# Patient Record
Sex: FEMALE | Race: WHITE | NOT HISPANIC OR LATINO | URBAN - METROPOLITAN AREA
[De-identification: names, ages, dates, MRNs, and addresses within clinical notes are randomized per-mention and may not be internally consistent; named-entity substitution may affect disease eponyms.]

---

## 2018-07-30 ENCOUNTER — EMERGENCY (EMERGENCY)
Facility: HOSPITAL | Age: 23
LOS: 0 days | Discharge: HOME | End: 2018-07-31
Attending: EMERGENCY MEDICINE | Admitting: EMERGENCY MEDICINE

## 2018-07-30 VITALS
WEIGHT: 125 LBS | DIASTOLIC BLOOD PRESSURE: 87 MMHG | HEIGHT: 64 IN | HEART RATE: 84 BPM | OXYGEN SATURATION: 98 % | TEMPERATURE: 98 F | RESPIRATION RATE: 17 BRPM | SYSTOLIC BLOOD PRESSURE: 118 MMHG

## 2018-07-30 DIAGNOSIS — Z98.890 OTHER SPECIFIED POSTPROCEDURAL STATES: Chronic | ICD-10-CM

## 2018-07-30 DIAGNOSIS — N12 TUBULO-INTERSTITIAL NEPHRITIS, NOT SPECIFIED AS ACUTE OR CHRONIC: ICD-10-CM

## 2018-07-30 DIAGNOSIS — Z79.899 OTHER LONG TERM (CURRENT) DRUG THERAPY: ICD-10-CM

## 2018-07-30 DIAGNOSIS — J45.909 UNSPECIFIED ASTHMA, UNCOMPLICATED: ICD-10-CM

## 2018-07-30 DIAGNOSIS — R10.9 UNSPECIFIED ABDOMINAL PAIN: ICD-10-CM

## 2018-07-30 LAB
ALBUMIN SERPL ELPH-MCNC: 4 G/DL — SIGNIFICANT CHANGE UP (ref 3.5–5.2)
ALP SERPL-CCNC: 66 U/L — SIGNIFICANT CHANGE UP (ref 30–115)
ALT FLD-CCNC: 13 U/L — SIGNIFICANT CHANGE UP (ref 0–41)
ANION GAP SERPL CALC-SCNC: 13 MMOL/L — SIGNIFICANT CHANGE UP (ref 7–14)
APPEARANCE UR: CLEAR — SIGNIFICANT CHANGE UP
AST SERPL-CCNC: 18 U/L — SIGNIFICANT CHANGE UP (ref 0–41)
BACTERIA # UR AUTO: ABNORMAL
BASOPHILS # BLD AUTO: 0.02 K/UL — SIGNIFICANT CHANGE UP (ref 0–0.2)
BASOPHILS NFR BLD AUTO: 0.4 % — SIGNIFICANT CHANGE UP (ref 0–1)
BILIRUB SERPL-MCNC: <0.2 MG/DL — SIGNIFICANT CHANGE UP (ref 0.2–1.2)
BILIRUB UR-MCNC: NEGATIVE — SIGNIFICANT CHANGE UP
BUN SERPL-MCNC: 11 MG/DL — SIGNIFICANT CHANGE UP (ref 10–20)
CALCIUM SERPL-MCNC: 9.5 MG/DL — SIGNIFICANT CHANGE UP (ref 8.5–10.1)
CHLORIDE SERPL-SCNC: 104 MMOL/L — SIGNIFICANT CHANGE UP (ref 98–110)
CO2 SERPL-SCNC: 25 MMOL/L — SIGNIFICANT CHANGE UP (ref 17–32)
COD CRY URNS QL: NEGATIVE — SIGNIFICANT CHANGE UP
COLOR SPEC: YELLOW — SIGNIFICANT CHANGE UP
CREAT SERPL-MCNC: 0.5 MG/DL — LOW (ref 0.7–1.5)
DIFF PNL FLD: ABNORMAL
EOSINOPHIL # BLD AUTO: 0.03 K/UL — SIGNIFICANT CHANGE UP (ref 0–0.7)
EOSINOPHIL NFR BLD AUTO: 0.5 % — SIGNIFICANT CHANGE UP (ref 0–8)
EPI CELLS # UR: ABNORMAL /HPF
GLUCOSE SERPL-MCNC: 104 MG/DL — HIGH (ref 70–99)
GLUCOSE UR QL: NEGATIVE — SIGNIFICANT CHANGE UP
GRAN CASTS # UR COMP ASSIST: NEGATIVE — SIGNIFICANT CHANGE UP
HCT VFR BLD CALC: 38.1 % — SIGNIFICANT CHANGE UP (ref 37–47)
HGB BLD-MCNC: 13 G/DL — SIGNIFICANT CHANGE UP (ref 12–16)
HYALINE CASTS # UR AUTO: NEGATIVE — SIGNIFICANT CHANGE UP
IMM GRANULOCYTES NFR BLD AUTO: 0.4 % — HIGH (ref 0.1–0.3)
KETONES UR-MCNC: NEGATIVE — SIGNIFICANT CHANGE UP
LEUKOCYTE ESTERASE UR-ACNC: NEGATIVE — SIGNIFICANT CHANGE UP
LYMPHOCYTES # BLD AUTO: 1.36 K/UL — SIGNIFICANT CHANGE UP (ref 1.2–3.4)
LYMPHOCYTES # BLD AUTO: 24.8 % — SIGNIFICANT CHANGE UP (ref 20.5–51.1)
MCHC RBC-ENTMCNC: 28.6 PG — SIGNIFICANT CHANGE UP (ref 27–31)
MCHC RBC-ENTMCNC: 34.1 G/DL — SIGNIFICANT CHANGE UP (ref 32–37)
MCV RBC AUTO: 83.7 FL — SIGNIFICANT CHANGE UP (ref 81–99)
MONOCYTES # BLD AUTO: 0.6 K/UL — SIGNIFICANT CHANGE UP (ref 0.1–0.6)
MONOCYTES NFR BLD AUTO: 10.9 % — HIGH (ref 1.7–9.3)
NEUTROPHILS # BLD AUTO: 3.46 K/UL — SIGNIFICANT CHANGE UP (ref 1.4–6.5)
NEUTROPHILS NFR BLD AUTO: 63 % — SIGNIFICANT CHANGE UP (ref 42.2–75.2)
NITRITE UR-MCNC: NEGATIVE — SIGNIFICANT CHANGE UP
NRBC # BLD: 0 /100 WBCS — SIGNIFICANT CHANGE UP (ref 0–0)
PH UR: 6 — SIGNIFICANT CHANGE UP (ref 5–8)
PLATELET # BLD AUTO: 204 K/UL — SIGNIFICANT CHANGE UP (ref 130–400)
POTASSIUM SERPL-MCNC: 3.6 MMOL/L — SIGNIFICANT CHANGE UP (ref 3.5–5)
POTASSIUM SERPL-SCNC: 3.6 MMOL/L — SIGNIFICANT CHANGE UP (ref 3.5–5)
PROT SERPL-MCNC: 6.8 G/DL — SIGNIFICANT CHANGE UP (ref 6–8)
PROT UR-MCNC: >=300 MG/DL — SIGNIFICANT CHANGE UP
RBC # BLD: 4.55 M/UL — SIGNIFICANT CHANGE UP (ref 4.2–5.4)
RBC # FLD: 12.4 % — SIGNIFICANT CHANGE UP (ref 11.5–14.5)
RBC CASTS # UR COMP ASSIST: ABNORMAL /HPF
SODIUM SERPL-SCNC: 142 MMOL/L — SIGNIFICANT CHANGE UP (ref 135–146)
SP GR SPEC: >=1.03 (ref 1.01–1.03)
TRI-PHOS CRY UR QL COMP ASSIST: NEGATIVE — SIGNIFICANT CHANGE UP
URATE CRY FLD QL MICRO: NEGATIVE — SIGNIFICANT CHANGE UP
UROBILINOGEN FLD QL: 0.2 — SIGNIFICANT CHANGE UP (ref 0.2–0.2)
WBC # BLD: 5.49 K/UL — SIGNIFICANT CHANGE UP (ref 4.8–10.8)
WBC # FLD AUTO: 5.49 K/UL — SIGNIFICANT CHANGE UP (ref 4.8–10.8)
WBC UR QL: NEGATIVE — SIGNIFICANT CHANGE UP

## 2018-07-30 RX ORDER — CEFTRIAXONE 500 MG/1
1 INJECTION, POWDER, FOR SOLUTION INTRAMUSCULAR; INTRAVENOUS ONCE
Qty: 0 | Refills: 0 | Status: COMPLETED | OUTPATIENT
Start: 2018-07-30 | End: 2018-07-30

## 2018-07-30 RX ORDER — SODIUM CHLORIDE 9 MG/ML
1000 INJECTION INTRAMUSCULAR; INTRAVENOUS; SUBCUTANEOUS ONCE
Qty: 0 | Refills: 0 | Status: COMPLETED | OUTPATIENT
Start: 2018-07-30 | End: 2018-07-30

## 2018-07-30 RX ORDER — CEFPODOXIME PROXETIL 100 MG
1 TABLET ORAL
Qty: 20 | Refills: 0
Start: 2018-07-30 | End: 2018-08-08

## 2018-07-30 RX ADMIN — SODIUM CHLORIDE 1000 MILLILITER(S): 9 INJECTION INTRAMUSCULAR; INTRAVENOUS; SUBCUTANEOUS at 20:46

## 2018-07-30 NOTE — ED PROVIDER NOTE - NS ED ROS FT
Review of Systems:  	•	CONSTITUTIONAL - no fever, no diaphoresis, no chills  	•	SKIN - no rash  	•	  	•	RESPIRATORY - no shortness of breath, no cough  	•	CARDIAC - no chest pain, no palpitations  	•	GI -abd pain, no nausea, no vomiting, no diarrhea, no constipation  	•	GENITO-URINARY - no discharge, no dysuria; no hematuria, no increased urinary frequency  	•	MUSCULOSKELETAL - no joint paint, no swelling, no redness  	•	NEUROLOGIC - no weakness, no headache, no paresthesias, no LOC  	•	PSYCH - no anxiety, non suicidal, non homicidal, no hallucination, no depression

## 2018-07-30 NOTE — ED PROVIDER NOTE - OBJECTIVE STATEMENT
this is 22 yo female presents to ed evaluation of flank pain . patient states she was diagnosed with kidney stones about a week ago. patient states that pain got worse tonight . patient has history of stones in past. patient denies any nausea and or vomiting. patient was scheduled for lipotrispy. patient states pain worse today .

## 2018-07-30 NOTE — ED PROVIDER NOTE - PROGRESS NOTE DETAILS
spoke to surgical pa  he contacted urologist  . they reviewed results . they will follow patient in office silas as scheduled.  will given antibiotics ATTENDING NOTE:  I personally evaluated the patient. I reviewed the Resident’s or Physician Assistant’s note (as assigned above), and agree with the findings and plan except as documented in my note.  23yF with hx of known kidney stone to left presents to ED for left sided flank pain and fever.  Pt states she developed pain to left flank 1 week ago, seen by her urologist and had (+)stone seen on [facility] xray.  Pt started on Flomax and surgical intervention scheduled for tomorrow.  Pt over last 2 days has had gradually worsening of pain and today developed fever.  Tmax 104.9  Pt contacted her urologist and was advised to come to ED for eval.  No vomiting.  No diarrhea.  No rash.  No dark urine, dysuria, frequency.  Exam as above.  No CVA tenderness.  PLAN:  Labs, urine, analgesia, surgery consult, CT.

## 2018-07-30 NOTE — ED PROVIDER NOTE - MEDICAL DECISION MAKING DETAILS
pt left flank pain 0  scheudled for lithotripsy with Dr gunn tomorrw- p/w fever  today  and continued flank pain -  CT no obstructing  kidney ston + pyelo -  pt tolerting PO no vomiting  in ED -  labs reviewe d-  ll results  discussed and printed for patient -  will dc with ab for pyelo and follow up urology tomorro w- case was disucssed withurology   and  urology  saw patient in ED - they recommend  DC pt left flank pain 0  scheduled for lithotripsy with Dr gunn tomorrow- p/w fever  today  and continued flank pain -  CT no obstructing  kidney stone + pyelo -  pt tolerating PO no vomiting  in ED -  labs reviewe d-  ll results  discussed and printed for patient -  will dc with ab for pyelo and follow up urology tomorro w- case was disucssed withurology   and  urology  saw patient in ED - they recommend  DC

## 2018-07-30 NOTE — ED ADULT TRIAGE NOTE - CHIEF COMPLAINT QUOTE
left abdominal pain radiating to back,  started 2 days ago, was due to get stones blasted but developed fever.

## 2018-07-31 VITALS
SYSTOLIC BLOOD PRESSURE: 123 MMHG | TEMPERATURE: 98 F | DIASTOLIC BLOOD PRESSURE: 81 MMHG | OXYGEN SATURATION: 99 % | RESPIRATION RATE: 18 BRPM | HEART RATE: 69 BPM

## 2018-07-31 RX ADMIN — CEFTRIAXONE 100 GRAM(S): 500 INJECTION, POWDER, FOR SOLUTION INTRAMUSCULAR; INTRAVENOUS at 00:21

## 2018-12-11 ENCOUNTER — EMERGENCY (EMERGENCY)
Facility: HOSPITAL | Age: 23
LOS: 0 days | Discharge: HOME | End: 2018-12-11
Attending: EMERGENCY MEDICINE | Admitting: EMERGENCY MEDICINE

## 2018-12-11 VITALS
DIASTOLIC BLOOD PRESSURE: 60 MMHG | WEIGHT: 126.99 LBS | OXYGEN SATURATION: 99 % | SYSTOLIC BLOOD PRESSURE: 142 MMHG | RESPIRATION RATE: 17 BRPM | TEMPERATURE: 100 F | HEART RATE: 94 BPM

## 2018-12-11 DIAGNOSIS — M54.9 DORSALGIA, UNSPECIFIED: ICD-10-CM

## 2018-12-11 DIAGNOSIS — J45.909 UNSPECIFIED ASTHMA, UNCOMPLICATED: ICD-10-CM

## 2018-12-11 DIAGNOSIS — Z79.899 OTHER LONG TERM (CURRENT) DRUG THERAPY: ICD-10-CM

## 2018-12-11 DIAGNOSIS — Z98.890 OTHER SPECIFIED POSTPROCEDURAL STATES: Chronic | ICD-10-CM

## 2018-12-11 DIAGNOSIS — M25.552 PAIN IN LEFT HIP: ICD-10-CM

## 2018-12-11 DIAGNOSIS — Z79.2 LONG TERM (CURRENT) USE OF ANTIBIOTICS: ICD-10-CM

## 2018-12-11 PROBLEM — N20.0 CALCULUS OF KIDNEY: Chronic | Status: ACTIVE | Noted: 2018-07-30

## 2018-12-11 RX ORDER — METHOCARBAMOL 500 MG/1
1500 TABLET, FILM COATED ORAL ONCE
Qty: 0 | Refills: 0 | Status: COMPLETED | OUTPATIENT
Start: 2018-12-11 | End: 2018-12-11

## 2018-12-11 RX ORDER — METHOCARBAMOL 500 MG/1
2 TABLET, FILM COATED ORAL
Qty: 12 | Refills: 0
Start: 2018-12-11 | End: 2018-12-13

## 2018-12-11 RX ORDER — IBUPROFEN 200 MG
1 TABLET ORAL
Qty: 60 | Refills: 0
Start: 2018-12-11 | End: 2018-12-30

## 2018-12-11 RX ORDER — KETOROLAC TROMETHAMINE 30 MG/ML
30 SYRINGE (ML) INJECTION ONCE
Qty: 0 | Refills: 0 | Status: DISCONTINUED | OUTPATIENT
Start: 2018-12-11 | End: 2018-12-11

## 2018-12-11 RX ORDER — ACETAMINOPHEN 500 MG
650 TABLET ORAL ONCE
Qty: 0 | Refills: 0 | Status: COMPLETED | OUTPATIENT
Start: 2018-12-11 | End: 2018-12-11

## 2018-12-11 RX ADMIN — METHOCARBAMOL 1500 MILLIGRAM(S): 500 TABLET, FILM COATED ORAL at 02:00

## 2018-12-11 RX ADMIN — Medication 650 MILLIGRAM(S): at 02:00

## 2018-12-11 RX ADMIN — Medication 30 MILLIGRAM(S): at 02:00

## 2018-12-11 NOTE — ED PROVIDER NOTE - NS ED ROS FT
Constitutional: no fever, chills, no recent weight loss, change in appetite or malaise  Eyes: no redness/discharge/pain/vision changes  Cardiac: No chest pain, SOB or edema.  Respiratory: No cough or respiratory distress  GI: No nausea, vomiting, diarrhea or abdominal pain.  MS: + left sided hip pain with radiation down leg.   Neuro: No headache or weakness. No LOC. No decreased sensation/paresthesias  Skin: No skin rash.  Except as documented in the HPI, all other systems are negative.

## 2018-12-11 NOTE — ED PROVIDER NOTE - NSFOLLOWUPCLINICS_GEN_ALL_ED_FT
[facility]  Rehabilitation  [address] [place] [address]  Phone: [phone]  Fax:   Follow Up Time:

## 2018-12-11 NOTE — ED PROVIDER NOTE - ATTENDING CONTRIBUTION TO CARE
I personally evaluated the patient. I reviewed the Resident’s or Physician Assistant’s note (as assigned above), and agree with the findings and plan except as documented in my note.     23 female here for evaluation of left hip pain for several weeks worse recently. Under care of a chiropractor who manipulates and adjusts her 3x/week with no relief. No fever chills back pain or vomiting.     PE: female in no distress. HEENT: normal mucosa. CHEST: CTA bilateral. CV: pulses intact. EXTR: limited ROM of the LLE due to discomfort. NEURO: AAO 3 no focal deficits.     Impression: back pain     Plan: supportive care, reevaluation

## 2018-12-11 NOTE — ED PROVIDER NOTE - PHYSICAL EXAMINATION
CONSTITUTIONAL: Well-appearing; well-nourished; in no apparent distress.   NECK: Supple; non-tender; no cervical lymphadenopathy. No JVD.   CARDIOVASCULAR: Normal S1, S2; no murmurs, rubs, or gallops.   RESPIRATORY: Normal chest excursion with respiration; breath sounds clear and equal bilaterally; no wheezes, rhonchi, or rales.  GI/: Normal bowel sounds; non-distended; non-tender; no palpable organomegaly.   MS: No evidence of trauma or deformity. No midline spinal tenderness. Stable pelvis. Strength equal b/l 5/5 throughout. Normal ROM in all four extremities; + straight leg test. Normal gait. Pedal pulses intact  SKIN: Normal for age and race; warm; dry; good turgor; no apparent lesions or exudate.   NEURO/PSYCH: A & O x 4; grossly unremarkable. mood and manner are appropriate. Grooming and personal hygiene are appropriate.

## 2018-12-11 NOTE — ED PROVIDER NOTE - OBJECTIVE STATEMENT
23 year old female with pmhx of juvenile RA c/o left hip pain with radiation down left hip x 2 weeks. Pt had MRI of L/S spine done in [email or web address] on 12/01 which showed mild L4-L5 disc protrusion. She denies any trauma, decreased sensation, urinary/fecal incontinence, saddle anesthesia, fever/chills, IVDA, bruising, headache, neck pain, chest pain, sob.

## 2018-12-11 NOTE — ED PROVIDER NOTE - MEDICAL DECISION MAKING DETAILS
23 female here for left leg pain. Has outpatient management not improving care. Has Follow up with Ortho this week.   No acute neurologic deficits. Supportive care, outpatient management.

## 2018-12-17 ENCOUNTER — EMERGENCY (EMERGENCY)
Facility: HOSPITAL | Age: 23
LOS: 0 days | Discharge: HOME | End: 2018-12-17
Attending: EMERGENCY MEDICINE | Admitting: EMERGENCY MEDICINE

## 2018-12-17 VITALS
RESPIRATION RATE: 17 BRPM | DIASTOLIC BLOOD PRESSURE: 57 MMHG | TEMPERATURE: 98 F | SYSTOLIC BLOOD PRESSURE: 116 MMHG | HEART RATE: 84 BPM | OXYGEN SATURATION: 100 % | WEIGHT: 125 LBS

## 2018-12-17 DIAGNOSIS — M25.562 PAIN IN LEFT KNEE: ICD-10-CM

## 2018-12-17 DIAGNOSIS — M25.552 PAIN IN LEFT HIP: ICD-10-CM

## 2018-12-17 DIAGNOSIS — J45.909 UNSPECIFIED ASTHMA, UNCOMPLICATED: ICD-10-CM

## 2018-12-17 DIAGNOSIS — M25.569 PAIN IN UNSPECIFIED KNEE: ICD-10-CM

## 2018-12-17 DIAGNOSIS — Z98.890 OTHER SPECIFIED POSTPROCEDURAL STATES: Chronic | ICD-10-CM

## 2018-12-17 DIAGNOSIS — Z79.899 OTHER LONG TERM (CURRENT) DRUG THERAPY: ICD-10-CM

## 2018-12-17 RX ORDER — ACETAMINOPHEN 500 MG
975 TABLET ORAL ONCE
Qty: 0 | Refills: 0 | Status: COMPLETED | OUTPATIENT
Start: 2018-12-17 | End: 2018-12-17

## 2018-12-17 RX ADMIN — Medication 975 MILLIGRAM(S): at 06:14

## 2018-12-17 NOTE — ED PROVIDER NOTE - NS ED ROS FT
aching Constitutional: no fevers/chills, no sick contacts  Eyes: No visual changes, eye pain or discharge. No photophobia  ENMT: No hearing changes, pain, discharge or infections. No sore throat or drooling.  Neck:  No neck pain or stiffness. No limited ROM  Cardiac: No SOB or edema. No chest pain with exertion.  Respiratory: No cough or respiratory distress. No hemoptysis. No history of asthma or RAD  GI: No nausea, vomiting, diarrhea or abdominal pain  : No dysuria, frequency or burning. No discharge  MS: No myalgia, muscle weakness, + joint pain, no back pain  Neuro: No headache or weakness. No LOC  Skin: No skin rash  Endo: no diabetes or thyroid dysfunction  Heme: no abnormal bleeding or clotting  Except as documented in the HPI, all other systems are negative

## 2018-12-17 NOTE — ED ADULT NURSE NOTE - OBJECTIVE STATEMENT
pt is a 23yF p/w L hip and knee pain x 2wk.  Pt is ambulating on her LLE but complains of worsening pain, especially tonight.  She reports pain is worse than her usual achy arthritis pains and not relieved by robaxin prescribed by neurology and ortho.  She saw these specialists when pain started last week,  Has not taken any pain medications tonight. full rom, no swelling/redness/warmth, no fever.

## 2018-12-17 NOTE — ED PROVIDER NOTE - MEDICAL DECISION MAKING DETAILS
23yF p/w worsening LLE pain in the midst of extensive o/p specialist workup.  Well appearing, well hydrated young woman.  No joint edema or gross deformity. Able to ambulate.  No PE risk factors.  Will treat pain and recommend close f/u with PCP and specialists, supportive care, return precautions.

## 2018-12-17 NOTE — ED PROVIDER NOTE - OBJECTIVE STATEMENT
23yF with RA p/w L hip and knee pain x 2wk.  Pt is ambulating on her LLE but complains of worsening pain, especially tonight.  She reports pain is worse than her usual achy arthritis pains and not relieved by robaxin prescribed by neurology and ortho.  She saw these specialists when pain started last week, has had neg MRI and has a follow up appointment today for nerve study.  No PE risk factors.  Has not taken any pain medications tonight.

## 2018-12-17 NOTE — ED PROVIDER NOTE - NSFOLLOWUPINSTRUCTIONS_ED_ALL_ED_FT
Follow up with your primary physician and specialists for your joint pain.      Knee Pain, Adult  Knee pain in adults is common. It can be caused by many things, including:    Arthritis.  A fluid-filled sac (cyst) or growth in your knee.  An infection in your knee.  An injury that will not heal.  Damage, swelling, or irritation of the tissues that support your knee.    ImageKnee pain is usually not a sign of a serious problem. The pain may go away on its own with time and rest. If it does not, a health care provider may order tests to find the cause of the pain. These may include:    Imaging tests, such as an X-ray, MRI, or ultrasound.  Joint aspiration. In this test, fluid is removed from the knee.  Arthroscopy. In this test, a lighted tube is inserted into knee and an image is projected onto a TV screen.  A biopsy. In this test, a sample of tissue is removed from the body and studied under a microscope.    Follow these instructions at home:  Pay attention to any changes in your symptoms. Take these actions to relieve your pain.    Activity     Rest your knee.  Do not do things that cause pain or make pain worse.  Avoid high-impact activities or exercises, such as running, jumping rope, or doing jumping jacks.  General instructions     Take over-the-counter and prescription medicines only as told by your health care provider.  Raise (elevate) your knee above the level of your heart when you are sitting or lying down.  Sleep with a pillow under your knee.  If directed, apply ice to the knee:    Put ice in a plastic bag.  Place a towel between your skin and the bag.  Leave the ice on for 20 minutes, 2–3 times a day.    Ask your health care provider if you should wear an elastic knee support.  Lose weight if you are overweight. Extra weight can put pressure on your knee.  Do not use any products that contain nicotine or tobacco, such as cigarettes and e-cigarettes. Smoking may slow the healing of any bone and joint problems that you may have. If you need help quitting, ask your health care provider.  Contact a health care provider if:  Your knee pain continues, changes, or gets worse.  You have a fever along with knee pain.  Your knee calderon or locks up.  Your knee swells, and the swelling becomes worse.  Get help right away if:  Your knee feels warm to the touch.  You cannot move your knee.  You have severe pain in your knee.  You have chest pain.  You have trouble breathing.  Summary  Knee pain in adults is common. It can be caused by many things, including, arthritis, infection, cysts, or injury.  Knee pain is usually not a sign of a serious problem, but if it does not go away, a health care provider may perform tests to know the cause of the pain.  Pay attention to any changes in your symptoms. Relieve your pain with rest, medicines, light activity, and use of ice.  Get help if your pain continues or becomes very severe, or if your knee calderon or locks up, or if you have chest pain or trouble breathing.  This information is not intended to replace advice given to you by your health care provider. Make sure you discuss any questions you have with your health care provider.

## 2018-12-17 NOTE — ED PROVIDER NOTE - PHYSICAL EXAMINATION
CONSTITUTIONAL: well developed; well nourished; well appearing in no acute distress  HEAD: normocephalic; atraumatic  EYES: no conjunctival injection, no scleral icterus  ENT: no nasal discharge; airway clear.  NECK: supple; non tender. + full passive ROM in all directions  CARD: S1, S2 normal; no murmurs, gallops, or rubs. Regular rate and rhythm  RESP: no wheezes, rales or rhonchi. Good air movement bilaterally without significant accessory muscle use  ABD: soft; non-distended; non-tender. No rebound, no guarding, no pulsatile abdominal mass  EXT: moving all extremities spontaneously, normal ROM. No clubbing, cyanosis or edema, mild crepitus on knee flexion, no laxity on varus/valgus stress  SKIN: warm and dry, no lesions noted  NEURO: alert, oriented, CN II-XII grossly intact, motor and sensory grossly intact, speech nonslurred, stable gait, no focal deficits. GCS 15  PSYCH: calm, cooperative, appropriate, good eye contact, logical thought process, no apparent danger to self or others

## 2019-01-07 ENCOUNTER — EMERGENCY (EMERGENCY)
Facility: HOSPITAL | Age: 24
LOS: 0 days | Discharge: HOME | End: 2019-01-07
Attending: EMERGENCY MEDICINE | Admitting: EMERGENCY MEDICINE

## 2019-01-07 VITALS
SYSTOLIC BLOOD PRESSURE: 121 MMHG | RESPIRATION RATE: 18 BRPM | TEMPERATURE: 98 F | HEART RATE: 96 BPM | OXYGEN SATURATION: 100 % | DIASTOLIC BLOOD PRESSURE: 86 MMHG | WEIGHT: 125 LBS

## 2019-01-07 VITALS
OXYGEN SATURATION: 100 % | HEART RATE: 84 BPM | DIASTOLIC BLOOD PRESSURE: 84 MMHG | RESPIRATION RATE: 20 BRPM | SYSTOLIC BLOOD PRESSURE: 128 MMHG | TEMPERATURE: 98 F

## 2019-01-07 DIAGNOSIS — Z79.2 LONG TERM (CURRENT) USE OF ANTIBIOTICS: ICD-10-CM

## 2019-01-07 DIAGNOSIS — Z79.899 OTHER LONG TERM (CURRENT) DRUG THERAPY: ICD-10-CM

## 2019-01-07 DIAGNOSIS — R50.9 FEVER, UNSPECIFIED: ICD-10-CM

## 2019-01-07 DIAGNOSIS — J45.909 UNSPECIFIED ASTHMA, UNCOMPLICATED: ICD-10-CM

## 2019-01-07 DIAGNOSIS — R11.2 NAUSEA WITH VOMITING, UNSPECIFIED: ICD-10-CM

## 2019-01-07 DIAGNOSIS — Z79.1 LONG TERM (CURRENT) USE OF NON-STEROIDAL ANTI-INFLAMMATORIES (NSAID): ICD-10-CM

## 2019-01-07 DIAGNOSIS — Z98.890 OTHER SPECIFIED POSTPROCEDURAL STATES: Chronic | ICD-10-CM

## 2019-01-07 DIAGNOSIS — R63.8 OTHER SYMPTOMS AND SIGNS CONCERNING FOOD AND FLUID INTAKE: ICD-10-CM

## 2019-01-07 LAB
ALBUMIN SERPL ELPH-MCNC: 4 G/DL — SIGNIFICANT CHANGE UP (ref 3.5–5.2)
ALP SERPL-CCNC: 78 U/L — SIGNIFICANT CHANGE UP (ref 30–115)
ALT FLD-CCNC: 30 U/L — SIGNIFICANT CHANGE UP (ref 0–41)
ANION GAP SERPL CALC-SCNC: 11 MMOL/L — SIGNIFICANT CHANGE UP (ref 7–14)
APPEARANCE UR: ABNORMAL
AST SERPL-CCNC: 38 U/L — SIGNIFICANT CHANGE UP (ref 0–41)
BACTERIA # UR AUTO: ABNORMAL
BASOPHILS # BLD AUTO: 0.01 K/UL — SIGNIFICANT CHANGE UP (ref 0–0.2)
BASOPHILS NFR BLD AUTO: 0.3 % — SIGNIFICANT CHANGE UP (ref 0–1)
BILIRUB DIRECT SERPL-MCNC: <0.2 MG/DL — SIGNIFICANT CHANGE UP (ref 0–0.2)
BILIRUB INDIRECT FLD-MCNC: >0.2 MG/DL — SIGNIFICANT CHANGE UP (ref 0.2–1.2)
BILIRUB SERPL-MCNC: 0.4 MG/DL — SIGNIFICANT CHANGE UP (ref 0.2–1.2)
BILIRUB UR-MCNC: ABNORMAL
BUN SERPL-MCNC: 8 MG/DL — LOW (ref 10–20)
CALCIUM SERPL-MCNC: 9 MG/DL — SIGNIFICANT CHANGE UP (ref 8.5–10.1)
CHLORIDE SERPL-SCNC: 102 MMOL/L — SIGNIFICANT CHANGE UP (ref 98–110)
CO2 SERPL-SCNC: 25 MMOL/L — SIGNIFICANT CHANGE UP (ref 17–32)
COD CRY URNS QL: NEGATIVE — SIGNIFICANT CHANGE UP
COLOR SPEC: YELLOW — SIGNIFICANT CHANGE UP
CREAT SERPL-MCNC: 0.7 MG/DL — SIGNIFICANT CHANGE UP (ref 0.7–1.5)
DIFF PNL FLD: ABNORMAL
EOSINOPHIL # BLD AUTO: 0.03 K/UL — SIGNIFICANT CHANGE UP (ref 0–0.7)
EOSINOPHIL NFR BLD AUTO: 0.8 % — SIGNIFICANT CHANGE UP (ref 0–8)
EPI CELLS # UR: ABNORMAL /HPF
GLUCOSE SERPL-MCNC: 89 MG/DL — SIGNIFICANT CHANGE UP (ref 70–99)
GLUCOSE UR QL: NEGATIVE MG/DL — SIGNIFICANT CHANGE UP
GRAN CASTS # UR COMP ASSIST: NEGATIVE — SIGNIFICANT CHANGE UP
HCT VFR BLD CALC: 36.6 % — LOW (ref 37–47)
HGB BLD-MCNC: 12.6 G/DL — SIGNIFICANT CHANGE UP (ref 12–16)
HYALINE CASTS # UR AUTO: NEGATIVE — SIGNIFICANT CHANGE UP
IMM GRANULOCYTES NFR BLD AUTO: 0.3 % — SIGNIFICANT CHANGE UP (ref 0.1–0.3)
KETONES UR-MCNC: 40
LEUKOCYTE ESTERASE UR-ACNC: NEGATIVE — SIGNIFICANT CHANGE UP
LYMPHOCYTES # BLD AUTO: 0.83 K/UL — LOW (ref 1.2–3.4)
LYMPHOCYTES # BLD AUTO: 22.2 % — SIGNIFICANT CHANGE UP (ref 20.5–51.1)
MCHC RBC-ENTMCNC: 27.6 PG — SIGNIFICANT CHANGE UP (ref 27–31)
MCHC RBC-ENTMCNC: 34.4 G/DL — SIGNIFICANT CHANGE UP (ref 32–37)
MCV RBC AUTO: 80.3 FL — LOW (ref 81–99)
MONOCYTES # BLD AUTO: 0.36 K/UL — SIGNIFICANT CHANGE UP (ref 0.1–0.6)
MONOCYTES NFR BLD AUTO: 9.6 % — HIGH (ref 1.7–9.3)
NEUTROPHILS # BLD AUTO: 2.5 K/UL — SIGNIFICANT CHANGE UP (ref 1.4–6.5)
NEUTROPHILS NFR BLD AUTO: 66.8 % — SIGNIFICANT CHANGE UP (ref 42.2–75.2)
NITRITE UR-MCNC: NEGATIVE — SIGNIFICANT CHANGE UP
NRBC # BLD: 0 /100 WBCS — SIGNIFICANT CHANGE UP (ref 0–0)
PH UR: 6 — SIGNIFICANT CHANGE UP (ref 5–8)
PLATELET # BLD AUTO: 140 K/UL — SIGNIFICANT CHANGE UP (ref 130–400)
POTASSIUM SERPL-MCNC: 3.8 MMOL/L — SIGNIFICANT CHANGE UP (ref 3.5–5)
POTASSIUM SERPL-SCNC: 3.8 MMOL/L — SIGNIFICANT CHANGE UP (ref 3.5–5)
PROT SERPL-MCNC: 7.2 G/DL — SIGNIFICANT CHANGE UP (ref 6–8)
PROT UR-MCNC: >=300 MG/DL
RBC # BLD: 4.56 M/UL — SIGNIFICANT CHANGE UP (ref 4.2–5.4)
RBC # FLD: 12.4 % — SIGNIFICANT CHANGE UP (ref 11.5–14.5)
RBC CASTS # UR COMP ASSIST: ABNORMAL /HPF
SODIUM SERPL-SCNC: 138 MMOL/L — SIGNIFICANT CHANGE UP (ref 135–146)
SP GR SPEC: >=1.03 (ref 1.01–1.03)
TRI-PHOS CRY UR QL COMP ASSIST: NEGATIVE — SIGNIFICANT CHANGE UP
URATE CRY FLD QL MICRO: NEGATIVE — SIGNIFICANT CHANGE UP
UROBILINOGEN FLD QL: 0.2 MG/DL — SIGNIFICANT CHANGE UP (ref 0.2–0.2)
WBC # BLD: 3.74 K/UL — LOW (ref 4.8–10.8)
WBC # FLD AUTO: 3.74 K/UL — LOW (ref 4.8–10.8)
WBC UR QL: SIGNIFICANT CHANGE UP /HPF

## 2019-01-07 RX ORDER — SODIUM CHLORIDE 9 MG/ML
1000 INJECTION, SOLUTION INTRAVENOUS
Qty: 0 | Refills: 0 | Status: DISCONTINUED | OUTPATIENT
Start: 2019-01-07 | End: 2019-01-07

## 2019-01-07 RX ORDER — ONDANSETRON 8 MG/1
4 TABLET, FILM COATED ORAL ONCE
Qty: 0 | Refills: 0 | Status: COMPLETED | OUTPATIENT
Start: 2019-01-07 | End: 2019-01-07

## 2019-01-07 RX ADMIN — ONDANSETRON 4 MILLIGRAM(S): 8 TABLET, FILM COATED ORAL at 16:24

## 2019-01-07 RX ADMIN — SODIUM CHLORIDE 1000 MILLILITER(S): 9 INJECTION, SOLUTION INTRAVENOUS at 16:25

## 2019-01-07 NOTE — ED PROVIDER NOTE - ATTENDING CONTRIBUTION TO CARE
24 yo F h/o JRA presents with c/o nausea, vomiting and diarrhea and fever x 5 days,  no urinary complaints.  no rash, no known sick contacts.  On exam pt in NAD AAO x 3, Op clear, MMM, no lad, Lungs CAT B/L no wrr, abd is soft + BS, nt nd, no edema, no fnd

## 2019-01-07 NOTE — ED PROVIDER NOTE - NS ED ROS FT
CONSTITUTIONAL: (+) fevers, (+) chills, (-) fatigue, (-) unintentional weight loss, (+) decreased appetite  EYES: (-) eye redness, (-) eye discharge, (-) tearing  ENT: (-) ear pain, (-) ear drainage, (-) congestion, (-) rhinorrhea, (-) sinus pain, (-) sore throat  NECK: (-) neck pain, (-) neck stiffness  CARDIOVASCULAR: (-) chest pain, (-) palpitations  RESPIRATORY: (-) cough, (-) sputum, (-) shortness of breath  GI: see HPI  : (-) dysuria, (-) hematuria, (-) frequency, (-) urgency, (-) incontinence, (-) difficulty urinating, (-) urinary retention, (-) flank pain, (-) vaginal discharge  MSK: (-) back pain, (-) arthralgias, (-) myalgias  INTEGUMENTARY: (-) rashes, (-) pallor, (-) ecchymosis  NEURO: (-) headache, (-) dizziness, (-) lightheadedness, (-) syncope, (-) weakness

## 2019-01-07 NOTE — ED PROVIDER NOTE - PROGRESS NOTE DETAILS
patient seen and evaluated by me. 1 L LR, 4 mg IV Zofran, ua/cx, u preg, cbc, cmp ordered. Will re-evaluate. patient resting comfortably in ED stretcher, reports resolution in her nausea. abd is soft, nontender in all four quadrants, no rebound or guarding.

## 2019-01-07 NOTE — ED PROVIDER NOTE - PHYSICAL EXAMINATION
VITALS:  I have reviewed the initial vital signs.  GENERAL: Well-developed, well-nourished, in no acute distress. Nontoxic appearing. Accompanied by mother.  HEENT: Sclera clear. EOMI, PERRLA. Mucous membranes moist, oropharynx nonerythematous without exudate. Tonsils 2+ bilaterally. Uvula midline and without edema. TM's clear b/l without bulging or erythema. Nasal turbinates clear and w/o discharge.  NECK: supple w FROM. No cervical adenopathy.  CARDIOVASCULAR: RRR, nl S1 and S2. No murmurs, rubs, or gallops. No edema. 2+ radial pulses bilaterally.  RESPIRATORY: Normal effort. CTA b/l without wheezes, rales, or rhonchi.  MSK: Moving all four extremities equally throughout.  GI: Normal bowel sounds. Abdomen soft and non-distended. Nontender in all four quadrants without rebound or guarding.  : Mild left CVA ttp. No right CVA ttp.  INTEGUMENTARY: Warm, dry. No pallor or rashes. Capillary refill <2 seconds.  NEURO: A&Ox3. Speech clear. No gross motor or sensory deficits.

## 2019-01-07 NOTE — ED PROVIDER NOTE - MEDICAL DECISION MAKING DETAILS
Pt feeling better, results d/w them.  Tolerated po, abd remains soft nt nd.  Will follow up with PMD and return if any worsening symptoms or concerns.

## 2019-01-07 NOTE — ED PROVIDER NOTE - OBJECTIVE STATEMENT
23 year old female w hx of asthma, JRA (no current or recent immunosuppressants) presents to the ED with 5 days of nausea, vomiting, diarrhea and fevers. Tmax of 103.0F earlier today, relieved with 800 mg IBU. Emesis and diarrhea are nonbloody, nonbilious. Denies recent travel, sick contacts, antibiotics, or new/suspicious foods. Denies abdominal pain, back pain, URI-like symptoms, rashes, irritative voiding symptoms, or abnormal vaginal d/c. LMP 2 weeks ago, denies chance of pregnancy. No hx of abd surgeries. Last successful po intake (fluids) 10:00 AM today.

## 2019-01-08 LAB
CULTURE RESULTS: SIGNIFICANT CHANGE UP
SPECIMEN SOURCE: SIGNIFICANT CHANGE UP

## 2019-07-29 ENCOUNTER — EMERGENCY (EMERGENCY)
Facility: HOSPITAL | Age: 24
LOS: 0 days | Discharge: HOME | End: 2019-07-29
Attending: EMERGENCY MEDICINE | Admitting: EMERGENCY MEDICINE
Payer: MEDICAID

## 2019-07-29 VITALS
DIASTOLIC BLOOD PRESSURE: 83 MMHG | HEART RATE: 99 BPM | RESPIRATION RATE: 16 BRPM | OXYGEN SATURATION: 99 % | SYSTOLIC BLOOD PRESSURE: 133 MMHG | WEIGHT: 130.07 LBS | TEMPERATURE: 98 F

## 2019-07-29 VITALS
DIASTOLIC BLOOD PRESSURE: 78 MMHG | RESPIRATION RATE: 16 BRPM | OXYGEN SATURATION: 98 % | HEART RATE: 85 BPM | TEMPERATURE: 98 F | SYSTOLIC BLOOD PRESSURE: 128 MMHG

## 2019-07-29 DIAGNOSIS — R39.198 OTHER DIFFICULTIES WITH MICTURITION: ICD-10-CM

## 2019-07-29 DIAGNOSIS — R10.9 UNSPECIFIED ABDOMINAL PAIN: ICD-10-CM

## 2019-07-29 DIAGNOSIS — Z98.890 OTHER SPECIFIED POSTPROCEDURAL STATES: Chronic | ICD-10-CM

## 2019-07-29 LAB
ALBUMIN SERPL ELPH-MCNC: 3.7 G/DL — SIGNIFICANT CHANGE UP (ref 3.5–5.2)
ALP SERPL-CCNC: 72 U/L — SIGNIFICANT CHANGE UP (ref 30–115)
ALT FLD-CCNC: 8 U/L — SIGNIFICANT CHANGE UP (ref 0–41)
ANION GAP SERPL CALC-SCNC: 9 MMOL/L — SIGNIFICANT CHANGE UP (ref 7–14)
APPEARANCE UR: CLEAR — SIGNIFICANT CHANGE UP
AST SERPL-CCNC: 14 U/L — SIGNIFICANT CHANGE UP (ref 0–41)
BACTERIA # UR AUTO: NEGATIVE — SIGNIFICANT CHANGE UP
BASOPHILS # BLD AUTO: 0.03 K/UL — SIGNIFICANT CHANGE UP (ref 0–0.2)
BASOPHILS NFR BLD AUTO: 0.4 % — SIGNIFICANT CHANGE UP (ref 0–1)
BILIRUB DIRECT SERPL-MCNC: <0.2 MG/DL — SIGNIFICANT CHANGE UP (ref 0–0.2)
BILIRUB INDIRECT FLD-MCNC: >0 MG/DL — LOW (ref 0.2–1.2)
BILIRUB SERPL-MCNC: 0.2 MG/DL — SIGNIFICANT CHANGE UP (ref 0.2–1.2)
BILIRUB UR-MCNC: NEGATIVE — SIGNIFICANT CHANGE UP
BUN SERPL-MCNC: 10 MG/DL — SIGNIFICANT CHANGE UP (ref 10–20)
CALCIUM SERPL-MCNC: 9 MG/DL — SIGNIFICANT CHANGE UP (ref 8.5–10.1)
CHLORIDE SERPL-SCNC: 103 MMOL/L — SIGNIFICANT CHANGE UP (ref 98–110)
CO2 SERPL-SCNC: 27 MMOL/L — SIGNIFICANT CHANGE UP (ref 17–32)
COLOR SPEC: SIGNIFICANT CHANGE UP
CREAT SERPL-MCNC: 0.6 MG/DL — LOW (ref 0.7–1.5)
DIFF PNL FLD: NEGATIVE — SIGNIFICANT CHANGE UP
EOSINOPHIL # BLD AUTO: 0.1 K/UL — SIGNIFICANT CHANGE UP (ref 0–0.7)
EOSINOPHIL NFR BLD AUTO: 1.2 % — SIGNIFICANT CHANGE UP (ref 0–8)
EPI CELLS # UR: 2 /HPF — SIGNIFICANT CHANGE UP (ref 0–5)
GLUCOSE SERPL-MCNC: 97 MG/DL — SIGNIFICANT CHANGE UP (ref 70–99)
GLUCOSE UR QL: NEGATIVE — SIGNIFICANT CHANGE UP
HCT VFR BLD CALC: 32.9 % — LOW (ref 37–47)
HGB BLD-MCNC: 11.2 G/DL — LOW (ref 12–16)
HYALINE CASTS # UR AUTO: 0 /LPF — SIGNIFICANT CHANGE UP (ref 0–7)
IMM GRANULOCYTES NFR BLD AUTO: 0.4 % — HIGH (ref 0.1–0.3)
KETONES UR-MCNC: NEGATIVE — SIGNIFICANT CHANGE UP
LEUKOCYTE ESTERASE UR-ACNC: NEGATIVE — SIGNIFICANT CHANGE UP
LYMPHOCYTES # BLD AUTO: 1.85 K/UL — SIGNIFICANT CHANGE UP (ref 1.2–3.4)
LYMPHOCYTES # BLD AUTO: 23 % — SIGNIFICANT CHANGE UP (ref 20.5–51.1)
MCHC RBC-ENTMCNC: 28.3 PG — SIGNIFICANT CHANGE UP (ref 27–31)
MCHC RBC-ENTMCNC: 34 G/DL — SIGNIFICANT CHANGE UP (ref 32–37)
MCV RBC AUTO: 83.1 FL — SIGNIFICANT CHANGE UP (ref 81–99)
MONOCYTES # BLD AUTO: 0.52 K/UL — SIGNIFICANT CHANGE UP (ref 0.1–0.6)
MONOCYTES NFR BLD AUTO: 6.5 % — SIGNIFICANT CHANGE UP (ref 1.7–9.3)
NEUTROPHILS # BLD AUTO: 5.53 K/UL — SIGNIFICANT CHANGE UP (ref 1.4–6.5)
NEUTROPHILS NFR BLD AUTO: 68.5 % — SIGNIFICANT CHANGE UP (ref 42.2–75.2)
NITRITE UR-MCNC: NEGATIVE — SIGNIFICANT CHANGE UP
NRBC # BLD: 0 /100 WBCS — SIGNIFICANT CHANGE UP (ref 0–0)
PH UR: 7 — SIGNIFICANT CHANGE UP (ref 5–8)
PLATELET # BLD AUTO: 190 K/UL — SIGNIFICANT CHANGE UP (ref 130–400)
POTASSIUM SERPL-MCNC: 3.9 MMOL/L — SIGNIFICANT CHANGE UP (ref 3.5–5)
POTASSIUM SERPL-SCNC: 3.9 MMOL/L — SIGNIFICANT CHANGE UP (ref 3.5–5)
PROT SERPL-MCNC: 6.6 G/DL — SIGNIFICANT CHANGE UP (ref 6–8)
PROT UR-MCNC: ABNORMAL
RBC # BLD: 3.96 M/UL — LOW (ref 4.2–5.4)
RBC # FLD: 12.3 % — SIGNIFICANT CHANGE UP (ref 11.5–14.5)
RBC CASTS # UR COMP ASSIST: 4 /HPF — SIGNIFICANT CHANGE UP (ref 0–4)
SODIUM SERPL-SCNC: 139 MMOL/L — SIGNIFICANT CHANGE UP (ref 135–146)
SP GR SPEC: 1.01 — LOW (ref 1.01–1.02)
UROBILINOGEN FLD QL: SIGNIFICANT CHANGE UP
WBC # BLD: 8.06 K/UL — SIGNIFICANT CHANGE UP (ref 4.8–10.8)
WBC # FLD AUTO: 8.06 K/UL — SIGNIFICANT CHANGE UP (ref 4.8–10.8)
WBC UR QL: 1 /HPF — SIGNIFICANT CHANGE UP (ref 0–5)

## 2019-07-29 PROCEDURE — 99284 EMERGENCY DEPT VISIT MOD MDM: CPT

## 2019-07-29 PROCEDURE — 74176 CT ABD & PELVIS W/O CONTRAST: CPT | Mod: 26

## 2019-07-29 RX ORDER — KETOROLAC TROMETHAMINE 30 MG/ML
15 SYRINGE (ML) INJECTION ONCE
Refills: 0 | Status: DISCONTINUED | OUTPATIENT
Start: 2019-07-29 | End: 2019-07-29

## 2019-07-29 RX ORDER — SODIUM CHLORIDE 9 MG/ML
2000 INJECTION, SOLUTION INTRAVENOUS ONCE
Refills: 0 | Status: COMPLETED | OUTPATIENT
Start: 2019-07-29 | End: 2019-07-29

## 2019-07-29 RX ADMIN — SODIUM CHLORIDE 2000 MILLILITER(S): 9 INJECTION, SOLUTION INTRAVENOUS at 04:30

## 2019-07-29 RX ADMIN — SODIUM CHLORIDE 2000 MILLILITER(S): 9 INJECTION, SOLUTION INTRAVENOUS at 01:48

## 2019-07-29 RX ADMIN — Medication 15 MILLIGRAM(S): at 02:04

## 2019-07-29 RX ADMIN — Medication 15 MILLIGRAM(S): at 01:49

## 2019-07-29 NOTE — ED ADULT TRIAGE NOTE - CHIEF COMPLAINT QUOTE
pt c/o R flank pain and dysuria since 6pm. pt denies hematuria and stated the pain is similar to previous stones. pt has hx of renal calculi.

## 2019-07-29 NOTE — ED PROVIDER NOTE - OBJECTIVE STATEMENT
25 yo f pw R constant non radiating mild dull flank pain similar to previous episodes of kidney stones with associated hesitancy quick onset and ongoing for sev hr in duration, no provoking or modifying factors. No fevers, no chills, no cough, no sob.    I have reviewed available current nursing and previous documentation of past medical, surgical, family, and/or social history.

## 2019-07-29 NOTE — ED PROVIDER NOTE - PHYSICAL EXAMINATION
Physical Exam    Vital Signs: I have reviewed the initial vital signs.  Constitutional: well-nourished, appears stated age, no acute distress  Cardiovascular: +S1/S2, no murmurs, regular rate, regular rhythm, well-perfused extremities  Respiratory: unlabored respiratory effort, clear to auscultation bilaterally, speaks in full sentences  Gastrointestinal: soft, non-tender abdomen, no cvat

## 2019-07-29 NOTE — ED PROVIDER NOTE - CARE PROVIDER_API CALL
Parker Hamlin)  Urology  [address] [facility] [place] [address]  Phone: [phone]  Fax: [phone]  Follow Up Time:

## 2019-07-29 NOTE — ED PROVIDER NOTE - CLINICAL SUMMARY MEDICAL DECISION MAKING FREE TEXT BOX
Pw R flank pain, difficulty urinating. labs imaging reviewed. Pt feeling improved, tolerating PO, abdomen soft, non-tender, non-distended, no rebound, no guarding. Aware of all results, given a copy of all available results, comfortable with discharge and follow-up outpatient, strict return precautions given. Endorses understanding of all of this and aware that they can return at any time for new or concerning symptoms. No further questions or concerns at this time

## 2019-07-29 NOTE — ED PROVIDER NOTE - ATTENDING CONTRIBUTION TO CARE
patient states that has h/o kidney stones, here c/o Rt flank pain with urinary hesitancy, h/o similar symptoms in the past from kidney stones, denies f/c/n/v/cp/sob/trauma, denies any other associated symptoms.   vitals noted  lungs: CTA, no crackles  abd: +BS, NT, ND, soft  back: no midline vertebral column tenderness, no pain on ROM, +RT CVA tenderness  distally NVI  skin: no rash, no lesions  A/P: Rt flank pain  labs, CT, UA  symptomatic treatment  reevaluation

## 2019-07-29 NOTE — ED ADULT NURSE NOTE - CHPI ED NUR SYMPTOMS NEG
no diarrhea/no hematuria/no vomiting/no nausea/no abdominal distension/no blood in stool/no burning urination/no fever/no chills

## 2019-07-29 NOTE — ED PROVIDER NOTE - NS ED ROS FT
Review of Systems    Constitutional: (-) fever (-) weakness (-) diaphoresis   Eyes: (-) change in vision (-) eye pain  ENT: (-) sore throat (-) ear ache (-) nasal discharge  Cardiovascular: (-) chest pain  (-) palpitations  Respiratory: (-) SOB (-) cough   GI: (-) abdominal pain (-) N/V (-) diarrhea  : SEE HPI

## 2019-07-29 NOTE — ED PROVIDER NOTE - PROGRESS NOTE DETAILS
signed out to Dr. Steward pending labs/CT/dispo. Pt feeling improved, tolerating PO, abdomen soft, non-tender, non-distended, no rebound, no guarding. Aware of all results, given a copy of all available results, comfortable with discharge and follow-up outpatient, strict return precautions given. Endorses understanding of all of this and aware that they can return at any time for new or concerning symptoms. No further questions or concerns at this time

## 2019-07-29 NOTE — ED ADULT NURSE NOTE - OBJECTIVE STATEMENT
Patient is a 23 yo female with history of kidney stone c/o Rt flank pain with urinary hesitancy, denies n/v/, chest pain, SOB and trauma.

## 2019-09-12 ENCOUNTER — EMERGENCY (EMERGENCY)
Facility: HOSPITAL | Age: 24
LOS: 0 days | Discharge: HOME | End: 2019-09-12
Admitting: EMERGENCY MEDICINE
Payer: MEDICAID

## 2019-09-12 VITALS
OXYGEN SATURATION: 98 % | HEART RATE: 91 BPM | TEMPERATURE: 99 F | SYSTOLIC BLOOD PRESSURE: 121 MMHG | DIASTOLIC BLOOD PRESSURE: 74 MMHG | RESPIRATION RATE: 18 BRPM

## 2019-09-12 DIAGNOSIS — Z98.890 OTHER SPECIFIED POSTPROCEDURAL STATES: Chronic | ICD-10-CM

## 2019-09-12 DIAGNOSIS — K08.89 OTHER SPECIFIED DISORDERS OF TEETH AND SUPPORTING STRUCTURES: ICD-10-CM

## 2019-09-12 PROCEDURE — 99282 EMERGENCY DEPT VISIT SF MDM: CPT

## 2019-09-12 NOTE — ED PROVIDER NOTE - OBJECTIVE STATEMENT
25 y/o with no PMH p/w pain to L lower dental region. Pain is throbbing. Pt states she cracked her tooth about 6 months ago. Pain to tooth 18 and 19. No fever, chills, difficulty breathing, foul odor, trismus, discharge, swelling, warmth, decreased PO fluids, malaise, loose teeth.

## 2019-09-12 NOTE — ED PROVIDER NOTE - NS ED ROS FT
Review of Systems:  	•	CONSTITUTIONAL - no fever, no diaphoresis, no chills  	•	SKIN - no rash  	•	HEMATOLOGIC - no bleeding, no bruising  	•	EYES - no eye pain, no blurry vision  	•	ENT - (+) L lower dental pain, no congestion  	•	NEUROLOGIC - no weakness, no headache, no paresthesias, no LOC  	•	PSYCH - no anxiety, non suicidal, non homicidal, no hallucination, no depression  	All other ROS are negative except as documented in HPI.

## 2019-09-12 NOTE — ED PROVIDER NOTE - PHYSICAL EXAMINATION
VITAL SIGNS: I have reviewed nursing notes and confirm.  CONSTITUTIONAL: Well-developed; well-nourished; in no acute distress.  SKIN: Skin exam is warm and dry, no acute rash.  HEAD: Normocephalic; atraumatic.  EYES: PERRL, EOM intact; conjunctiva and sclera clear.  ENT: (+)Tenderness to percussion to tooth #18 and #19, no abscess noted. No nasal discharge; airway clear. TMs clear.  NECK: Supple; non tender.  CARD: S1, S2 normal; no murmurs, gallops, or rubs. Regular rate and rhythm.  RESP: No wheezes, rales or rhonchi.  ABD: Normal bowel sounds; soft; non-distended; non-tender; no hepatosplenomegaly.  NEURO: Alert, oriented. Grossly unremarkable. No focal deficits.  PSYCH: Cooperative, appropriate.

## 2019-09-12 NOTE — ED PROVIDER NOTE - CLINICAL SUMMARY MEDICAL DECISION MAKING FREE TEXT BOX
Pt with dental pain. Will refer to dental clinic. I have discussed the discharge plan with the patient. The patient agrees with the plan, as discussed.  The patient understands Emergency Department diagnosis is a preliminary diagnosis often based on limited information and that the patient must adhere to the follow-up plan as discussed.  The patient understands that if the symptoms worsen or if prescribed medications do not have the desired/planned effect that the patient may return to the Emergency Department at any time for further evaluation and treatment.

## 2019-09-12 NOTE — CONSULT NOTE ADULT - SUBJECTIVE AND OBJECTIVE BOX
Patient is a 24y old  Female who presents with a chief complaint of     HPI: tooth cracked 6 months ago, pain in lower left      PAST MEDICAL & SURGICAL HISTORY:  Chronic arthritis  Asthma  Kidney stones  Status post laser lithotripsy of ureteral calculus    ( -  ) heart valve replacement  (  - ) joint replacement  ( -  ) pregnancy    MEDICATIONS  (STANDING):    MEDICATIONS  (PRN):      Allergies    No Known Allergies    Intolerances        FAMILY HISTORY:  No pertinent family history in first degree relatives      *SOCIAL HISTORY: ( + ) Tobacco; ( -  ) ETOH    *Last Dental Visit:    Vital Signs Last 24 Hrs  T(C): 37.4 (12 Sep 2019 09:27), Max: 37.4 (12 Sep 2019 09:27)  T(F): 99.4 (12 Sep 2019 09:27), Max: 99.4 (12 Sep 2019 09:27)  HR: 91 (12 Sep 2019 09:27) (91 - 91)  BP: 121/74 (12 Sep 2019 09:27) (121/74 - 121/74)  BP(mean): --  RR: 18 (12 Sep 2019 09:27) (18 - 18)  SpO2: 98% (12 Sep 2019 09:27) (98% - 98%)    LABS:    EOE:  TMJ (  - ) clicks                     ( -  ) pops                     (  - ) crepitus             Mandible <<FROM>>             Facial bones and MOM <<grossly intact>>             (  - ) trismus             (  - ) lymphadenopathy             (  - ) swelling             (  - ) asymmetry             (  - ) palpation             (  - ) dyspnea             (  - ) dysphagia             (  - ) loss of consciousness    IOE:  permanent dentition, multiple decayed and filled           hard/soft palate:  (   ) palatal torus, <<No pathology noted>>           tongue/FOM <<No pathology noted>>           labial/buccal mucosa <<No pathology noted>>           (  + ) percussion           (  - ) palpation           (  - ) swelling            (  - ) abscess           (  - ) sinus tract    Dentition present: permanent dentition, multiple decayed and filled  Mobility: none  Caries: #18 and #19         *DENTAL RADIOGRAPHS: 1 periapical    RADIOLOGY & ADDITIONAL STUDIES:    *ASSESSMENT: #18 caries, #19 caries      *PLAN: #18 requires extracition    PROCEDURE:   Verbal and written consent given.  Anesthesia: none  Treatment: Tooth #18 requires extraction, since #18 is extensively decayed the extraction needs to be done by specialist. Recommended patient to see a specialist that takes her insurance. Patient understands. Informed patient that #19 has decay and recommended patient to see her dentist.  Patient understands. Prescribed amoxicillin 500 three times a day for 7 days and ibuprofen 600 mg q6h.     RECOMMENDATIONS:  1) Take medications as prescribed  2) Dental F/U with outpatient dentist for comprehensive dental care.   3) If any difficulty swallowing/breathing, fever occur, return to ER.     Barby Chou, DMD

## 2021-01-11 PROBLEM — Z00.00 ENCOUNTER FOR PREVENTIVE HEALTH EXAMINATION: Status: ACTIVE | Noted: 2021-01-11

## 2021-01-12 ENCOUNTER — APPOINTMENT (OUTPATIENT)
Dept: ORTHOPEDIC SURGERY | Facility: CLINIC | Age: 26
End: 2021-01-12
Payer: COMMERCIAL

## 2021-01-12 VITALS — HEIGHT: 63 IN | WEIGHT: 120 LBS | BODY MASS INDEX: 21.26 KG/M2

## 2021-01-12 PROCEDURE — 99204 OFFICE O/P NEW MOD 45 MIN: CPT

## 2021-01-12 PROCEDURE — 99072 ADDL SUPL MATRL&STAF TM PHE: CPT

## 2021-01-12 NOTE — DATA REVIEWED
[de-identified] : 1/8/2021–bone scan: There is increased uptake in the known sclerotic lesion at the posterior proximal tibial metadiaphysis.  No other sites of pathologic uptake.\par \par Left knee x-rays (9/2/2020): There is a 17 x 14 mm blastic appearing lesion along the posterior aspect of the proximal metadiaphysis of the tibia.  There is no pavan fracture and there is no periosteal reaction.\par \par 1/8/2021–left knee CT scan: 17 x 13 mm mixed sclerotic lesion over the posterior aspect of the tibial metadiaphysis.  Similar to the x-ray findings, there is no periosteal reaction or pathologic fracture.  The lesion appears to be confined within the intramedullary cavity, adherent to the posterior endosteum.\par \par 12/29/2020–left tibia MRI: There is a 1.3 x 1.2 x 1.9 cm enhancing T1 hypointense and T2 hyperintense well-circumscribed mass over the posterior metadiaphysis of the proximal tibia, confined to the medullary cavity with surrounding edema/enhancement, as well as periosteal edema/enhancement..\par \par 11/13/2019: Left knee x-rays: There is a faint area of sclerosis noted in the posterior proximal tibial shaft, presumably a healed fibrous cortical defect, per the report.\par

## 2021-01-12 NOTE — HISTORY OF PRESENT ILLNESS
[FreeTextEntry1] : This is a 25-year-old female with a history of JRA (diagnosed at age 3, no systemic treatment), nephrolithiasis (2018) who is presenting with 2 years of worsening left knee pain.  Denies any recent trauma.  Her pain has been severe, 9 out of 10 in severity localized to the posterior lateral aspect of her knee occasionally radiating to her ankle.  The pain improves to 8 out of 10 with ibuprofen.  The pain is at its worst when she is sitting with her leg in extension and improves when she bends her knee and when she starts walking, 4 out of 10 in severity.  An initial work-up with x-rays performed 2 years ago was negative for any pathologic findings, however recently she obtained new x-rays and an MRI which did show an indeterminate lesion in the proximal tibia metadiaphysis.  She was therefore referred for further evaluation.  On further questioning, the patient also notes an 80 pound weight loss over the past 2 years, unintentional, possibly attributed to the nephrolithiasis per the patient (200lb to 120lb).  She also notes night sweats once a week which is at her baseline.  Family history significant for an aunt with breast cancer.

## 2021-01-12 NOTE — DISCUSSION/SUMMARY
[de-identified] : This is a 25-year-old female with an indeterminant lesion within the left proximal tibia metadiaphysis.  The differential is wide and includes various benign and malignant lesions.  I have therefore recommended a CT-guided biopsy from an anteromedial approach to better determine the etiology of this lesion, which will then guide further treatment.  The possibility of developing a stress riser predisposing the bone to fracture after biopsy was also discussed.  Given that she has no pain with ambulation and the fact that the lesion has not weakened the surrounding cortex, she may continue weightbearing as tolerated.  Based on the clinical exam, her pain is likely related to underlying biceps femoris tendinitis which may be caused by her underlying JRA.  Incidentally however imaging did reveal a suspicious lesion in the proximal tibia which does require further work-up.  All questions were answered.

## 2021-01-12 NOTE — PHYSICAL EXAM
[FreeTextEntry1] : General: well nourished, in no acute distress, alert and oriented to person, place and time.\par Psychiatric: normal mood and affect, no abnormal movements or speech patterns.\par Eyes: vision intact without deficits, sclera and conjunctiva were normal, pupils were equal in size. \par ENT: Ears and nose were normal in appearance. No thyromegaly.\par Lymph: no enlarged nodes, no lymphedema in extremity.\par Respiratory: Normal respiratory rhythm and effort. No wheezing detected without auscultation. No shortness of breath or respiratory distress.\par Cardiac: no cardiac related leg swelling, 2+ peripheral pulses.\par Neurology: normal gross sensation in extremities to light touch.\par Abdomen: soft, non-tender, tympanic, no masses.\par \par LLE:\par \par Skin CDI. No effusion, swelling, or ecchymosis. ROM: 0-130 degrees, with significant pain on extension, resolved with flexion. +TTP directly over biceps femoris tendon, worsened on extension.  No varus/valgus instability. No TTP along tibial spine. No TTP over quadriceps/patellar tendon. No TTP over tibial tubercle or pes insertion. No palpable masses. No lymphedema.\par Neg Lachman. Neg Hoa's. \par Alignment: Neutral\par EHL/FHL/GS/TA 5/5. S/S/SP/DP/T SILT. Toes warm, BCR. Compartments soft.\par \par Gait:\par The patient walks with a nonantalgic gait. Patient is able to toe and heel walk without difficulty, without use of assistive devices.

## 2021-01-19 ENCOUNTER — APPOINTMENT (OUTPATIENT)
Dept: INTERVENTIONAL RADIOLOGY/VASCULAR | Facility: CLINIC | Age: 26
End: 2021-01-19
Payer: COMMERCIAL

## 2021-01-19 VITALS — HEIGHT: 63 IN | WEIGHT: 130 LBS | BODY MASS INDEX: 23.04 KG/M2

## 2021-01-19 PROCEDURE — XXXXX: CPT

## 2021-01-19 RX ORDER — IBUPROFEN 800 MG/1
TABLET, FILM COATED ORAL
Refills: 0 | Status: ACTIVE | COMMUNITY

## 2021-01-19 RX ORDER — UBIDECARENONE/VIT E ACET 100MG-5
CAPSULE ORAL
Refills: 0 | Status: ACTIVE | COMMUNITY

## 2021-01-29 ENCOUNTER — RESULT REVIEW (OUTPATIENT)
Age: 26
End: 2021-01-29

## 2021-01-29 ENCOUNTER — OUTPATIENT (OUTPATIENT)
Dept: OUTPATIENT SERVICES | Facility: HOSPITAL | Age: 26
LOS: 1 days | End: 2021-01-29
Payer: COMMERCIAL

## 2021-01-29 DIAGNOSIS — M89.9 DISORDER OF BONE, UNSPECIFIED: ICD-10-CM

## 2021-01-29 DIAGNOSIS — Z98.890 OTHER SPECIFIED POSTPROCEDURAL STATES: Chronic | ICD-10-CM

## 2021-01-29 LAB — HCG UR QL: NEGATIVE — SIGNIFICANT CHANGE UP

## 2021-01-29 PROCEDURE — 88360 TUMOR IMMUNOHISTOCHEM/MANUAL: CPT | Mod: 26

## 2021-01-29 PROCEDURE — 88342 IMHCHEM/IMCYTCHM 1ST ANTB: CPT | Mod: 26,59

## 2021-01-29 PROCEDURE — 88364 INSITU HYBRIDIZATION (FISH): CPT | Mod: 26

## 2021-01-29 PROCEDURE — 77012 CT SCAN FOR NEEDLE BIOPSY: CPT | Mod: 26

## 2021-01-29 PROCEDURE — 20225 BONE BIOPSY TROCAR/NDL DEEP: CPT | Mod: GC

## 2021-01-29 PROCEDURE — 88305 TISSUE EXAM BY PATHOLOGIST: CPT | Mod: 26

## 2021-01-29 PROCEDURE — 88365 INSITU HYBRIDIZATION (FISH): CPT | Mod: 26,59

## 2021-01-29 PROCEDURE — 88341 IMHCHEM/IMCYTCHM EA ADD ANTB: CPT | Mod: 26,59

## 2021-01-29 PROCEDURE — 88367 INSITU HYBRIDIZATION AUTO: CPT | Mod: 26

## 2021-01-29 PROCEDURE — 88173 CYTOPATH EVAL FNA REPORT: CPT | Mod: 26

## 2021-01-29 RX ORDER — TRAMADOL HYDROCHLORIDE 50 MG/1
50 TABLET ORAL EVERY 6 HOURS
Refills: 0 | Status: DISCONTINUED | OUTPATIENT
Start: 2021-01-29 | End: 2021-01-29

## 2021-02-03 ENCOUNTER — NON-APPOINTMENT (OUTPATIENT)
Age: 26
End: 2021-02-03

## 2021-02-03 DIAGNOSIS — M79.605 PAIN IN LEFT LEG: ICD-10-CM

## 2021-02-03 DIAGNOSIS — M89.9 DISORDER OF BONE, UNSPECIFIED: ICD-10-CM

## 2021-02-05 ENCOUNTER — APPOINTMENT (OUTPATIENT)
Dept: ORTHOPEDIC SURGERY | Facility: CLINIC | Age: 26
End: 2021-02-05
Payer: COMMERCIAL

## 2021-02-05 VITALS
HEART RATE: 86 BPM | WEIGHT: 128 LBS | BODY MASS INDEX: 22.68 KG/M2 | HEIGHT: 63 IN | DIASTOLIC BLOOD PRESSURE: 79 MMHG | OXYGEN SATURATION: 96 % | SYSTOLIC BLOOD PRESSURE: 118 MMHG

## 2021-02-05 PROCEDURE — 99072 ADDL SUPL MATRL&STAF TM PHE: CPT

## 2021-02-05 PROCEDURE — 99213 OFFICE O/P EST LOW 20 MIN: CPT

## 2021-02-05 RX ORDER — MELOXICAM 15 MG/1
15 TABLET ORAL
Qty: 30 | Refills: 1 | Status: ACTIVE | COMMUNITY
Start: 2021-02-05 | End: 1900-01-01

## 2021-02-05 NOTE — DISCUSSION/SUMMARY
[de-identified] : This is a 25-year-old female with a unknown left proximal tibia ossifying lesion.  This may represent an osteoblastoma, pending final pathology results.  If this is confirmed, I will recommend an RFA procedure which may be successful in treating this condition.  I have discussed that occasionally multiple attempts at RFA may be required for full resolution.  If attempts at RFA fail surgical management will be recommended at the last resort.  We will discuss this further after the final pathology results are made.

## 2021-02-05 NOTE — PHYSICAL EXAM
[FreeTextEntry1] : General: well nourished, in no acute distress, alert and oriented to person, place and time.\par Psychiatric: normal mood and affect, no abnormal movements or speech patterns.\par Eyes: vision intact without deficits, sclera and conjunctiva were normal, pupils were equal in size. \par ENT: Ears and nose were normal in appearance. No thyromegaly.\par Lymph: no enlarged nodes, no lymphedema in extremity.\par Respiratory: Normal respiratory rhythm and effort. No wheezing detected without auscultation. No shortness of breath or respiratory distress.\par Cardiac: no cardiac related leg swelling, 2+ peripheral pulses.\par Neurology: normal gross sensation in extremities to light touch.\par Abdomen: soft, non-tender, tympanic, no masses.\par \par LLE:\par \par Skin CDI. No effusion, swelling, or ecchymosis. ROM: 0-130 degrees, with significant pain on extension, resolved with flexion. +TTP directly over biceps femoris tendon, worsened on extension. No varus/valgus instability. No TTP along tibial spine. No TTP over quadriceps/patellar tendon. No TTP over tibial tubercle or pes insertion. No palpable masses. No lymphedema.\par Neg Lachman. Neg oHa's. \par Alignment: Neutral\par EHL/FHL/GS/TA 5/5. S/S/SP/DP/T SILT. Toes warm, BCR. Compartments soft.\par \par Gait:\par The patient walks with a nonantalgic gait. Patient is able to toe and heel walk without difficulty, without use of assistive devices. \par \par

## 2021-02-05 NOTE — HISTORY OF PRESENT ILLNESS
[FreeTextEntry1] : This is a 25-year-old female with a history of JRA (diagnosed at age 3, no systemic treatment), nephrolithiasis (2018) who returns for FU after an IR guided biopsy of a left proximal tibia lesion (1/8/21). She has had 2 years of worsening left knee pain, currently up to 10/10, with significant improvement to 5/10 with ibuprofen. Denies any recent trauma.

## 2021-02-08 ENCOUNTER — TRANSCRIPTION ENCOUNTER (OUTPATIENT)
Age: 26
End: 2021-02-08

## 2021-02-16 ENCOUNTER — NON-APPOINTMENT (OUTPATIENT)
Age: 26
End: 2021-02-16

## 2021-02-16 LAB — NON-GYNECOLOGICAL CYTOLOGY STUDY: SIGNIFICANT CHANGE UP

## 2021-02-24 ENCOUNTER — TRANSCRIPTION ENCOUNTER (OUTPATIENT)
Age: 26
End: 2021-02-24

## 2021-02-24 NOTE — ASU PATIENT PROFILE, ADULT - PAIN CHRONIC, PROFILE
"Oncology Rooming Note    March 6, 2018 9:52 AM   Tricia Sosa is a 76 year old female who presents for:    Chief Complaint   Patient presents with     Oncology Clinic Visit     Follow up - CVID (common variable immunodeficienc)     Initial Vitals: /65  Pulse 78  Temp 97.2  F (36.2  C) (Tympanic)  Wt 71.8 kg (158 lb 6.4 oz)  SpO2 98%  BMI 21.48 kg/m2 Estimated body mass index is 21.48 kg/(m^2) as calculated from the following:    Height as of 12/12/17: 1.829 m (6' 0.01\").    Weight as of this encounter: 71.8 kg (158 lb 6.4 oz). Body surface area is 1.91 meters squared.  No Pain (1) Comment: Pain in Right arm rom shoveling   No LMP recorded. Patient is postmenopausal.  Allergies reviewed: Yes  Medications reviewed: Yes    Medications: Medication refills not needed today.  Pharmacy name entered into [facility]:    [id number] [place], [place] - [address] [place] [id number] [facility]    Clinical concerns: None    5 minutes for nursing intake (face to face time)     DISCHARGE PLAN:  Next appointments: See patient instruction section  Departure Mode: Ambulatory  Accompanied by: self  0 minutes for nursing discharge (face to face time)   MOHSEN Pryor CMA              "
no

## 2021-02-25 ENCOUNTER — APPOINTMENT (OUTPATIENT)
Dept: ORTHOPEDIC SURGERY | Facility: HOSPITAL | Age: 26
End: 2021-02-25

## 2021-02-25 ENCOUNTER — RESULT REVIEW (OUTPATIENT)
Age: 26
End: 2021-02-25

## 2021-02-25 ENCOUNTER — OUTPATIENT (OUTPATIENT)
Dept: OUTPATIENT SERVICES | Facility: HOSPITAL | Age: 26
LOS: 1 days | Discharge: HOME | End: 2021-02-25
Payer: COMMERCIAL

## 2021-02-25 ENCOUNTER — OUTPATIENT (OUTPATIENT)
Dept: OUTPATIENT SERVICES | Facility: HOSPITAL | Age: 26
LOS: 1 days | End: 2021-02-25
Payer: MEDICAID

## 2021-02-25 VITALS
OXYGEN SATURATION: 98 % | TEMPERATURE: 98 F | RESPIRATION RATE: 16 BRPM | SYSTOLIC BLOOD PRESSURE: 96 MMHG | DIASTOLIC BLOOD PRESSURE: 75 MMHG | HEART RATE: 69 BPM

## 2021-02-25 VITALS
OXYGEN SATURATION: 98 % | HEIGHT: 63 IN | WEIGHT: 128.97 LBS | DIASTOLIC BLOOD PRESSURE: 77 MMHG | SYSTOLIC BLOOD PRESSURE: 126 MMHG | HEART RATE: 96 BPM | TEMPERATURE: 99 F | RESPIRATION RATE: 16 BRPM

## 2021-02-25 DIAGNOSIS — Z98.890 OTHER SPECIFIED POSTPROCEDURAL STATES: Chronic | ICD-10-CM

## 2021-02-25 DIAGNOSIS — C85.90 NON-HODGKIN LYMPHOMA, UNSPECIFIED, UNSPECIFIED SITE: ICD-10-CM

## 2021-02-25 DIAGNOSIS — N89.9 NONINFLAMMATORY DISORDER OF VAGINA, UNSPECIFIED: ICD-10-CM

## 2021-02-25 DIAGNOSIS — M89.8X6 OTHER SPECIFIED DISORDERS OF BONE, LOWER LEG: ICD-10-CM

## 2021-02-25 LAB
GRAM STN FLD: SIGNIFICANT CHANGE UP
GRAM STN FLD: SIGNIFICANT CHANGE UP
HCG UR QL: NEGATIVE — SIGNIFICANT CHANGE UP
NIGHT BLUE STAIN TISS: SIGNIFICANT CHANGE UP
SPECIMEN SOURCE: SIGNIFICANT CHANGE UP

## 2021-02-25 PROCEDURE — 88237 TISSUE CULTURE BONE MARROW: CPT

## 2021-02-25 PROCEDURE — 20245 BONE BIOPSY OPEN DEEP: CPT | Mod: LT

## 2021-02-25 PROCEDURE — 88334 PATH CONSLTJ SURG CYTO XM EA: CPT | Mod: 26,59

## 2021-02-25 PROCEDURE — 88305 TISSUE EXAM BY PATHOLOGIST: CPT | Mod: 26

## 2021-02-25 PROCEDURE — 88331 PATH CONSLTJ SURG 1 BLK 1SPC: CPT | Mod: 26

## 2021-02-25 PROCEDURE — 88189 FLOWCYTOMETRY/READ 16 & >: CPT

## 2021-02-25 PROCEDURE — 88360 TUMOR IMMUNOHISTOCHEM/MANUAL: CPT | Mod: 26

## 2021-02-25 PROCEDURE — 88342 IMHCHEM/IMCYTCHM 1ST ANTB: CPT | Mod: 26,59

## 2021-02-25 PROCEDURE — 99241 OFFICE CONSULTATION NEW/ESTAB PATIENT 15 MIN: CPT

## 2021-02-25 PROCEDURE — G0452: CPT | Mod: 26

## 2021-02-25 PROCEDURE — 88341 IMHCHEM/IMCYTCHM EA ADD ANTB: CPT | Mod: 26,59

## 2021-02-25 RX ORDER — ONDANSETRON 8 MG/1
4 TABLET, FILM COATED ORAL ONCE
Refills: 0 | Status: DISCONTINUED | OUTPATIENT
Start: 2021-02-25 | End: 2021-03-11

## 2021-02-25 RX ORDER — KETOROLAC TROMETHAMINE 30 MG/ML
30 SYRINGE (ML) INJECTION ONCE
Refills: 0 | Status: DISCONTINUED | OUTPATIENT
Start: 2021-02-25 | End: 2021-02-25

## 2021-02-25 RX ORDER — TRAMADOL HYDROCHLORIDE 50 MG/1
1 TABLET ORAL
Qty: 15 | Refills: 0
Start: 2021-02-25 | End: 2021-02-27

## 2021-02-25 RX ORDER — FENTANYL CITRATE 50 UG/ML
25 INJECTION INTRAVENOUS
Refills: 0 | Status: DISCONTINUED | OUTPATIENT
Start: 2021-02-25 | End: 2021-02-25

## 2021-02-25 RX ORDER — SODIUM CHLORIDE 9 MG/ML
1000 INJECTION, SOLUTION INTRAVENOUS
Refills: 0 | Status: DISCONTINUED | OUTPATIENT
Start: 2021-02-25 | End: 2021-03-11

## 2021-02-25 RX ADMIN — SODIUM CHLORIDE 50 MILLILITER(S): 9 INJECTION, SOLUTION INTRAVENOUS at 10:00

## 2021-02-25 RX ADMIN — Medication 30 MILLIGRAM(S): at 10:38

## 2021-02-25 RX ADMIN — FENTANYL CITRATE 25 MICROGRAM(S): 50 INJECTION INTRAVENOUS at 10:00

## 2021-02-25 NOTE — H&P ADULT - HISTORY OF PRESENT ILLNESS
Patient is a 25 year old female presenting to [facility] for surgical management of the left leg. Patient states she was having pain for about 3 years in the left leg. States over the last 3 months pain had worsened to the point where is was daily. Patient states she Saw Dr Arpan Sheehan on Feb 8th and then again Feb 16th for pain. Was found to have a tibia mass and was recommended for Open biopsy for further management.

## 2021-02-25 NOTE — H&P ADULT - ASSESSMENT
Patient is a 25 year old female presenting to [facility] for tibial mass open biopsy    - Pain control / analgesia  -NPO  -OR today for tibial mass excision  -Notify orthopaedic surgeon with any questions or concerns

## 2021-02-25 NOTE — ASU DISCHARGE PLAN (ADULT/PEDIATRIC) - CARE PROVIDER_API CALL
Arpan Sheehan)  [address], [facility] [address]  [place] [address]  Phone: [phone]  Fax: ()-  Follow Up Time: 2 weeks

## 2021-02-25 NOTE — ASU DISCHARGE PLAN (ADULT/PEDIATRIC) - CALL YOUR DOCTOR IF YOU HAVE ANY OF THE FOLLOWING:
Bleeding that does not stop/Pain not relieved by Medications/Wound/Surgical Site with redness, or foul smelling discharge or pus/Numbness, tingling, color or temperature change to extremity/Nausea and vomiting that does not stop Bleeding that does not stop/Swelling that gets worse/Pain not relieved by Medications/Fever greater than (need to indicate Fahrenheit or Celsius)/Wound/Surgical Site with redness, or foul smelling discharge or pus/Numbness, tingling, color or temperature change to extremity/Nausea and vomiting that does not stop/Unable to urinate

## 2021-02-25 NOTE — H&P ADULT - ATTENDING COMMENTS
Agree with above. Pt with indeterminate L tibia lesion. Needle biopsy inconclusive. Indicated for left tibia open biopsy.     I have consented the patient for left tibia open biopsy. We discussed risks, benefits and alternatives. Rationale of care was reviewed and all questions were answered. Surgical risks include but are not limited to infection, bleeding, nerve damage, chronic pain, fracture, sampling error and need for further surgical procedures.  The patient understood all of this and would like to proceed.

## 2021-02-25 NOTE — ASU DISCHARGE PLAN (ADULT/PEDIATRIC) - ASU DC SPECIAL INSTRUCTIONSFT
Follow up with Dr. Jennifer Sheehan in 2 weeks  Keep dressing clean, dry, and intact. You may remove ace wrap in 48 hours but keep tegaderm dressing intact until follow up.   Partial weight bearing left lower extremity with crutches  Keep affected extremity elevated as much as possible. You may ice the leg (20 minutes ice on, 20 minutes off).   Take the tramadol as needed for severe pain. Do not take ibuprofen.   You may take Tylenol and ibuprofen as needed for pain. Follow up with Dr. Jennifer Sheehan in 2 weeks  Keep dressing clean, dry, and intact. You may remove ace wrap in 48 hours but keep tegaderm dressing intact until follow up.   Partial weight bearing left lower extremity with crutches  Keep affected extremity elevated as much as possible. You may ice the leg (20 minutes ice on, 20 minutes off).   Take the tramadol as needed for severe pain. Do not take ibuprofen.   You may take Tylenol and ibuprofen as needed for pain.    Information about your recovery and anesthesia

## 2021-02-25 NOTE — ASU DISCHARGE PLAN (ADULT/PEDIATRIC) - ACTIVITY LEVEL
Partial weight bearing LLE with crutches Partial weight bearing LLE with crutches/No excercise/No heavy lifting/Elevate extremity

## 2021-02-25 NOTE — ASU DISCHARGE PLAN (ADULT/PEDIATRIC) - FOLLOW UP APPOINTMENTS
[facility] PACU [phone]/[facility]

## 2021-02-25 NOTE — H&P ADULT - NSHPPHYSICALEXAM_GEN_ALL_CORE
LLE: Skin clean, dry, intact. No openings.  LLE: motor intact distally 5/5. sensation grossly intact to light touch in the distal extremities. Extremities are warm to touch. Minimal tenderness to touch. No obvious deformity noted.

## 2021-02-25 NOTE — H&P ADULT - NSHPSOURCEINFORD_GEN_ALL_CORE
Patient's PIV removed. Physical therapy came to see patient and cleared him for discharge home with walker. Dr Amari Todd advised patient to wear knee immobilizer and I have placed a knee immobilizer on patient's R knee. All belongings with patient.  Patient to be tentatively scheduled for this Friday 8/30/2019 Patient

## 2021-02-25 NOTE — ASU DISCHARGE PLAN (ADULT/PEDIATRIC) - NURSING INSTRUCTIONS
DO NOT take any Tylenol (Acetaminophen) or narcotics containing Tylenol until after 2:30 . You received Tylenol during your operation and it can cause damage to your liver if too much is taken within a 24 hour time period.   DO NOT TAKE MORE THAN 3000mg TYLENOL IN 24HRS   Do not take motrin, aleve or advil until after 4:30

## 2021-02-25 NOTE — BRIEF OPERATIVE NOTE - OPERATION/FINDINGS
L proximal tibia open biopsy  Preop: Unknown proximal tibia lesion  Postop: Unknown proximal tibia lesion

## 2021-03-01 LAB — TM INTERPRETATION: SIGNIFICANT CHANGE UP

## 2021-03-02 ENCOUNTER — NON-APPOINTMENT (OUTPATIENT)
Age: 26
End: 2021-03-02

## 2021-03-02 LAB
CHROM ANALY OVERALL INTERP SPEC-IMP: SIGNIFICANT CHANGE UP
CULTURE RESULTS: SIGNIFICANT CHANGE UP
CULTURE RESULTS: SIGNIFICANT CHANGE UP
SPECIMEN SOURCE: SIGNIFICANT CHANGE UP
SPECIMEN SOURCE: SIGNIFICANT CHANGE UP

## 2021-03-08 PROCEDURE — G0452: CPT | Mod: 26

## 2021-03-11 LAB
DNA PLOIDY SPEC FC-IMP: SIGNIFICANT CHANGE UP
DNA PLOIDY SPEC FC-IMP: SIGNIFICANT CHANGE UP

## 2021-03-12 ENCOUNTER — APPOINTMENT (OUTPATIENT)
Dept: ORTHOPEDIC SURGERY | Facility: CLINIC | Age: 26
End: 2021-03-12
Payer: COMMERCIAL

## 2021-03-12 VITALS — WEIGHT: 128 LBS | HEIGHT: 64 IN | BODY MASS INDEX: 21.85 KG/M2

## 2021-03-12 PROCEDURE — 99212 OFFICE O/P EST SF 10 MIN: CPT

## 2021-03-12 PROCEDURE — 99072 ADDL SUPL MATRL&STAF TM PHE: CPT

## 2021-03-15 NOTE — HISTORY OF PRESENT ILLNESS
[de-identified] : s/p L tibia open biopsy (2/25/21) [de-identified] : 25F returns, now 2 weeks post R tibia open biopsy. She says she feeling great, with near complete resolution of her pain, now 1/10 no meds. Able to ambulate and bear weight without pain. No fevers or wound drainage.  [de-identified] : NAD AOX3\par \par LLE:\par Incision CDI. \par Knee ROM: 0-130 degrees, with resolved pain. No TTP. \par EHL/FHL/GS/TA 5/5. S/S/SP/DP/T SILT. Toes warm, BCR. Compartments soft.\par \par Gait:\par The patient walks with a nonantalgic gait. Patient is able to toe and heel walk without difficulty, without use of assistive devices. \par  [de-identified] : Final pathology pending. (Prelim showing possible local inflammatory changes). All OR cultures showing no growth final. [de-identified] : This is a 25F w/ a likely localized inflammatory reaction of the L tibia, now feeling better after the biopsy, likely due this area being decompressed by the surgery. Still awaiting final pathology results for confirmation. She will return for FU to discuss the final diagnosis after the pathology arrives. Otherwise continue WBAT w/o restrictions.

## 2021-03-25 ENCOUNTER — NON-APPOINTMENT (OUTPATIENT)
Age: 26
End: 2021-03-25

## 2021-03-26 LAB — HEMATOPATHOLOGY REPORT: SIGNIFICANT CHANGE UP

## 2021-03-27 ENCOUNTER — NON-APPOINTMENT (OUTPATIENT)
Age: 26
End: 2021-03-27

## 2021-03-27 LAB
CULTURE RESULTS: SIGNIFICANT CHANGE UP
CULTURE RESULTS: SIGNIFICANT CHANGE UP
SPECIMEN SOURCE: SIGNIFICANT CHANGE UP
SPECIMEN SOURCE: SIGNIFICANT CHANGE UP

## 2021-03-28 ENCOUNTER — NON-APPOINTMENT (OUTPATIENT)
Age: 26
End: 2021-03-28

## 2021-04-17 LAB
CULTURE RESULTS: SIGNIFICANT CHANGE UP
SPECIMEN SOURCE: SIGNIFICANT CHANGE UP

## 2021-06-29 NOTE — ASU DISCHARGE PLAN (ADULT/PEDIATRIC) - DISCHARGE PLAN IS COMPLETE AND GIVEN TO PATIENT
Chief Complaint   Patient presents with    Hypertension    Diabetes    Lower Back Pain       Have you seen any other physician or provider since your last visit no    Have you had any other diagnostic tests since your last visit? no    Have you changed or stopped any medications since your last visit? no : Yes

## 2022-01-14 ENCOUNTER — RESULT REVIEW (OUTPATIENT)
Age: 27
End: 2022-01-14

## 2022-01-14 ENCOUNTER — OUTPATIENT (OUTPATIENT)
Dept: OUTPATIENT SERVICES | Facility: HOSPITAL | Age: 27
LOS: 1 days | Discharge: HOME | End: 2022-01-14
Payer: COMMERCIAL

## 2022-01-14 VITALS
DIASTOLIC BLOOD PRESSURE: 78 MMHG | SYSTOLIC BLOOD PRESSURE: 126 MMHG | HEART RATE: 89 BPM | TEMPERATURE: 97 F | RESPIRATION RATE: 17 BRPM | WEIGHT: 139.99 LBS | HEIGHT: 64 IN | OXYGEN SATURATION: 98 %

## 2022-01-14 VITALS — DIASTOLIC BLOOD PRESSURE: 56 MMHG | HEART RATE: 68 BPM | SYSTOLIC BLOOD PRESSURE: 112 MMHG | RESPIRATION RATE: 16 BRPM

## 2022-01-14 DIAGNOSIS — Z98.890 OTHER SPECIFIED POSTPROCEDURAL STATES: Chronic | ICD-10-CM

## 2022-01-14 PROCEDURE — 88304 TISSUE EXAM BY PATHOLOGIST: CPT | Mod: 26

## 2022-01-14 RX ORDER — ONDANSETRON 8 MG/1
1 TABLET, FILM COATED ORAL
Qty: 0 | Refills: 0 | DISCHARGE

## 2022-01-14 RX ORDER — ERGOCALCIFEROL 1.25 MG/1
0 CAPSULE ORAL
Qty: 0 | Refills: 0 | DISCHARGE

## 2022-01-14 RX ORDER — SODIUM CHLORIDE 9 MG/ML
1000 INJECTION, SOLUTION INTRAVENOUS
Refills: 0 | Status: DISCONTINUED | OUTPATIENT
Start: 2022-01-14 | End: 2022-01-28

## 2022-01-14 RX ORDER — SERTRALINE 25 MG/1
75 TABLET, FILM COATED ORAL
Qty: 0 | Refills: 0 | DISCHARGE

## 2022-01-14 RX ADMIN — SODIUM CHLORIDE 100 MILLILITER(S): 9 INJECTION, SOLUTION INTRAVENOUS at 14:18

## 2022-01-14 NOTE — ASU DISCHARGE PLAN (ADULT/PEDIATRIC) - NS MD DC FALL RISK RISK
For information on Fall & Injury Prevention, visit: [email or web address] OR  [email or web address] OR  https://www.cdc.gov/steadi/patient.html

## 2022-01-14 NOTE — H&P PST ADULT - ASSESSMENT
25 yo  at 8w GA, for ETOP.  -NPO, IVF   -RH positive  -on call to OR  -pelvic rest    Dr. Griffiths bedside

## 2022-01-14 NOTE — ASU DISCHARGE PLAN (ADULT/PEDIATRIC) - ASU DC SPECIAL INSTRUCTIONSFT
nothing in the vagina for 2 weeks, no tampons, no douching, no baths, no sex. Showers are fine.   Go to the emergency room if you have a temperature greater than 100.4, worsening abdominal pain or increased vaginal bleeding    See Dr. Griffiths in 2 weeks

## 2022-01-14 NOTE — ASU PATIENT PROFILE, ADULT - FALL HARM RISK - HARM RISK INTERVENTIONS

## 2022-01-14 NOTE — BRIEF OPERATIVE NOTE - NSICDXBRIEFPROCEDURE_GEN_ALL_CORE_FT
PROCEDURES:  D&C (dilatation and curettage) for termination of pregnancy 14-Jan-2022 13:21:43  Raquel Whittaker

## 2022-01-14 NOTE — ASU PATIENT PROFILE, ADULT - MEDICATIONS BROUGHT TO HOSPITAL, PROFILE
10/4/2017    Nilton presents to the clinic for his anticoagulation visit at the [facility].    (Z79.01) Long term current use of anticoagulant therapy  (primary encounter diagnosis)  Plan: INR - POINT OF CARE    (Z51.81) Encounter for therapeutic drug monitoring  Plan: INR - POINT OF CARE    (Z95.2) Mechanical heart valve present  Plan: INR - POINT OF CARE    (Z95.2) Aortic valve prosthesis present  Plan: INR - POINT OF CARE      INR Results  INR (no units)   Date Value   03/15/2013 2.1     INR-POC (no units)   Date Value   10/04/2017 2.1       Status  Patient denies missing any doses or taking extra doses of warfarin. He does, however, report taking a different dose than what was documented over the past month. Patient states he felt his diet changed so he took one extra mg of warfarin each week. Advised patient to contact the clinic the next time he feels his dose needs to be adjusted. He voiced understanding. Patient is at his goal between 2.0 and 3.0.    Dietary  Patient denies any dietary changes and is aware of effects of Vitamin K rich food. Discussed with patient how important it is to be consistent with Vitamin K foods and he verbalized understanding.    Medication  All medications were reviewed including supplements, with two changes reported by the patient. He started taking gabapentin and Cymbalta approximately one week ago. Informed the patient that according to [employer], Cymbalta can raise the INR in patients who also take warfarin but Gabapentin does not interact with warfarin. He voiced understanding. Dose of warfarin verified and patient currently taking it as prescribed.    Alcohol  Patient denies any alcohol consumption.    Review of Systems  SKIN: Denies any bruising or bleeding. No bruising or bleeding noted on patient when he was in the clinic today.  PSYCHOLOGIC: Alert. Oriented x3 to person, place, and time. Patient is self-administering medication.  OTHER: Patient denies  noting any blood in Stool, Urine, Gums, or Nose.    Plan of Care  Patient is within his goal with medication changes, and will continue current dosing. Hard copy of dosing instructions was provided to patient, as is documented in today's anticoagulation encounter. Education was given and all questions were answered to patients satisfaction. Patient will return to anticoagulation clinic in 4 weeks, and was encouraged to call with any changes in medications, bruising or bleeding problems or any other concerns and he verbalized understanding.  Discussed with Marissa Velasco NP in the clinic and she agrees with the plan of care.   no

## 2022-01-14 NOTE — ASU DISCHARGE PLAN (ADULT/PEDIATRIC) - CARE PROVIDER_API CALL
Susi Griffiths)  Obstetrics and Gynecology  [address] [place] [address]  Phone: [phone]  Fax: [phone]  Established Patient  Follow Up Time: 2 weeks

## 2022-01-18 LAB — SURGICAL PATHOLOGY STUDY: SIGNIFICANT CHANGE UP

## 2022-01-20 DIAGNOSIS — Z33.2 ENCOUNTER FOR ELECTIVE TERMINATION OF PREGNANCY: ICD-10-CM

## 2022-01-20 DIAGNOSIS — M19.90 UNSPECIFIED OSTEOARTHRITIS, UNSPECIFIED SITE: ICD-10-CM

## 2022-01-20 DIAGNOSIS — J45.909 UNSPECIFIED ASTHMA, UNCOMPLICATED: ICD-10-CM

## 2022-01-20 DIAGNOSIS — Z88.5 ALLERGY STATUS TO NARCOTIC AGENT: ICD-10-CM

## 2022-01-20 DIAGNOSIS — N85.4 MALPOSITION OF UTERUS: ICD-10-CM

## 2022-01-20 DIAGNOSIS — F17.290 NICOTINE DEPENDENCE, OTHER TOBACCO PRODUCT, UNCOMPLICATED: ICD-10-CM

## 2022-08-01 ENCOUNTER — APPOINTMENT (OUTPATIENT)
Dept: ORTHOPEDIC SURGERY | Facility: CLINIC | Age: 27
End: 2022-08-01

## 2022-08-01 VITALS
BODY MASS INDEX: 22.2 KG/M2 | OXYGEN SATURATION: 99 % | HEIGHT: 64 IN | HEART RATE: 80 BPM | SYSTOLIC BLOOD PRESSURE: 116 MMHG | WEIGHT: 130 LBS | DIASTOLIC BLOOD PRESSURE: 76 MMHG

## 2022-08-01 PROCEDURE — 99214 OFFICE O/P EST MOD 30 MIN: CPT

## 2022-08-01 PROCEDURE — 73590 X-RAY EXAM OF LOWER LEG: CPT | Mod: LT

## 2022-08-04 ENCOUNTER — OUTPATIENT (OUTPATIENT)
Dept: OUTPATIENT SERVICES | Facility: HOSPITAL | Age: 27
LOS: 1 days | End: 2022-08-04

## 2022-08-04 VITALS
DIASTOLIC BLOOD PRESSURE: 61 MMHG | WEIGHT: 143.96 LBS | TEMPERATURE: 98 F | HEIGHT: 64 IN | SYSTOLIC BLOOD PRESSURE: 114 MMHG | HEART RATE: 84 BPM | RESPIRATION RATE: 14 BRPM | OXYGEN SATURATION: 98 %

## 2022-08-04 DIAGNOSIS — Z87.898 PERSONAL HISTORY OF OTHER SPECIFIED CONDITIONS: ICD-10-CM

## 2022-08-04 DIAGNOSIS — N89.9 NONINFLAMMATORY DISORDER OF VAGINA, UNSPECIFIED: ICD-10-CM

## 2022-08-04 DIAGNOSIS — J45.909 UNSPECIFIED ASTHMA, UNCOMPLICATED: ICD-10-CM

## 2022-08-04 DIAGNOSIS — Z98.890 OTHER SPECIFIED POSTPROCEDURAL STATES: Chronic | ICD-10-CM

## 2022-08-04 DIAGNOSIS — Z87.81 PERSONAL HISTORY OF (HEALED) TRAUMATIC FRACTURE: Chronic | ICD-10-CM

## 2022-08-04 DIAGNOSIS — M76.899 OTHER SPECIFIED ENTHESOPATHIES OF UNSPECIFIED LOWER LIMB, EXCLUDING FOOT: ICD-10-CM

## 2022-08-04 DIAGNOSIS — M89.8X6 OTHER SPECIFIED DISORDERS OF BONE, LOWER LEG: Chronic | ICD-10-CM

## 2022-08-04 DIAGNOSIS — M89.8X6 OTHER SPECIFIED DISORDERS OF BONE, LOWER LEG: ICD-10-CM

## 2022-08-04 LAB
HCG UR QL: NEGATIVE — SIGNIFICANT CHANGE UP
HCT VFR BLD CALC: 40.5 % — SIGNIFICANT CHANGE UP (ref 34.5–45)
HGB BLD-MCNC: 13.3 G/DL — SIGNIFICANT CHANGE UP (ref 11.5–15.5)
MCHC RBC-ENTMCNC: 27.4 PG — SIGNIFICANT CHANGE UP (ref 27–34)
MCHC RBC-ENTMCNC: 32.8 GM/DL — SIGNIFICANT CHANGE UP (ref 32–36)
MCV RBC AUTO: 83.5 FL — SIGNIFICANT CHANGE UP (ref 80–100)
NRBC # BLD: 0 /100 WBCS — SIGNIFICANT CHANGE UP
NRBC # FLD: 0 K/UL — SIGNIFICANT CHANGE UP
PLATELET # BLD AUTO: 262 K/UL — SIGNIFICANT CHANGE UP (ref 150–400)
RBC # BLD: 4.85 M/UL — SIGNIFICANT CHANGE UP (ref 3.8–5.2)
RBC # FLD: 12.6 % — SIGNIFICANT CHANGE UP (ref 10.3–14.5)
WBC # BLD: 6.8 K/UL — SIGNIFICANT CHANGE UP (ref 3.8–10.5)
WBC # FLD AUTO: 6.8 K/UL — SIGNIFICANT CHANGE UP (ref 3.8–10.5)

## 2022-08-04 RX ORDER — SODIUM CHLORIDE 9 MG/ML
1000 INJECTION, SOLUTION INTRAVENOUS
Refills: 0 | Status: DISCONTINUED | OUTPATIENT
Start: 2022-08-11 | End: 2022-08-25

## 2022-08-04 RX ORDER — SODIUM CHLORIDE 9 MG/ML
3 INJECTION INTRAMUSCULAR; INTRAVENOUS; SUBCUTANEOUS EVERY 8 HOURS
Refills: 0 | Status: DISCONTINUED | OUTPATIENT
Start: 2022-08-11 | End: 2022-08-25

## 2022-08-04 NOTE — H&P PST ADULT - PROBLEM SELECTOR PLAN 1
Assessment and Plan: Patient scheduled for surgery on 8/11/22  Patient provided with verbal and written presurgical instructions; verbalized understanding  with teach back.    Patient provided with famotidine for GI prophylaxis; verbalized understanding.    Patient provided with Chlorhexidine wash, verbal and written instructions reviewed. Patient demonstrated understanding with teach back.   Patient instructed to obtain preop covid pcr, lab directory provided     Problem: Pre-menopausal   Urine specimen cup provided

## 2022-08-04 NOTE — H&P PST ADULT - NSICDXFAMILYHX_GEN_ALL_CORE_FT
FAMILY HISTORY:  Family history of hyperparathyroidism    Father  Still living? No  Family history of diabetes mellitus (DM), Age at diagnosis: Age Unknown

## 2022-08-04 NOTE — H&P PST ADULT - ATTENDING COMMENTS
I have consented the patient for left tibia open biopsy, possible curettage and bone grafting with allograft. We discussed risks, benefits and alternatives. Rationale of care was reviewed and all questions were answered. Surgical risks include but are not limited to infection, bleeding, nerve damage, chronic pain, fracture, weakness, limited ROM, local recurrene, and need for further surgical procedures pending final pathology results. Patient is aware of the wide range of possible final diagnoses including both benign and malignant etiologies. The patient understood all of this and would like to proceed. I have consented the patient for left tibia open biopsy, possible curettage and bone grafting with allograft. We discussed risks, benefits and alternatives. Rationale of care was reviewed and all questions were answered. Surgical risks include but are not limited to infection, bleeding, nerve damage, chronic pain, fracture, weakness, limited ROM, local recurrence, and need for further surgical procedures pending final pathology results. Patient is aware of the wide range of possible final diagnoses including both benign and malignant etiologies. The patient understood all of this and would like to proceed.

## 2022-08-04 NOTE — H&P PST ADULT - RESPIRATORY AND THORAX COMMENTS
HX asthma- anesthesia provoked and exercise induced HX asthma- anesthesia- "induced" and exercise induced

## 2022-08-04 NOTE — H&P PST ADULT - ANESTHESIA, PREVIOUS REACTION, PROFILE
reports 2 anesthesia provoked asthma attacks as soon as put to sleep ; Denies family history of malignant hyperthermia/respiratory complications reports 2 "anesthesia induced" asthma attacks as soon as put to sleep ; Denies family history of malignant hyperthermia/respiratory complications

## 2022-08-04 NOTE — H&P PST ADULT - NSICDXPASTMEDICALHX_GEN_ALL_CORE_FT
PAST MEDICAL HISTORY:  Anxiety and depression     Asthma provoked by high intensity exercise and Anesthesia, last used rescue inhaler one year ago    History of anesthesia complications Reports she has had 2 anesthesia provked asthma attack shortly after administration of agent    Juvenile rheumatoid arthritis     Kidney stones     Missed  'very early'    Tibial mass left    Uveitis age 13

## 2022-08-04 NOTE — H&P PST ADULT - HISTORY OF PRESENT ILLNESS
27 yr old female presents with preop dx noninflammatory disorder scheduled for open biopsy and frozen section of left tibial lesion possible curettage bone grafting, reports history of laney RA, noted left hip achiness and pressure and pain in proximal tibia , s/p tibial bone biopsy and shaving 2/2021, benign result  27 yr old female presents with preop dx inflammatory disorder scheduled for open biopsy and frozen section of left tibial lesion possible curettage bone grafting, reports history of laney RA, noted left hip achiness and pressure and pain in proximal tibia , s/p tibial bone biopsy and shaving 2/2021, benign result

## 2022-08-04 NOTE — H&P PST ADULT - NSICDXPASTSURGICALHX_GEN_ALL_CORE_FT
PAST SURGICAL HISTORY:  S/P dilation and curettage     Status post laser lithotripsy of ureteral calculus     Tibial mass 2/2021

## 2022-08-04 NOTE — H&P PST ADULT - PROBLEM SELECTOR PLAN 2
Dr Diallo informed patient with hx of anesthesia induced asthma attack- medical evaluation requested Dr Diallo informed patient with hx of anesthesia induced asthma attack-  anesthesia requesting anesthesia records from [facility]

## 2022-08-10 ENCOUNTER — TRANSCRIPTION ENCOUNTER (OUTPATIENT)
Age: 27
End: 2022-08-10

## 2022-08-10 NOTE — ASU PATIENT PROFILE, ADULT - FALL HARM RISK - RISK INTERVENTIONS

## 2022-08-11 ENCOUNTER — OUTPATIENT (OUTPATIENT)
Dept: OUTPATIENT SERVICES | Facility: HOSPITAL | Age: 27
LOS: 1 days | Discharge: HOME | End: 2022-08-11

## 2022-08-11 ENCOUNTER — TRANSCRIPTION ENCOUNTER (OUTPATIENT)
Age: 27
End: 2022-08-11

## 2022-08-11 ENCOUNTER — RESULT REVIEW (OUTPATIENT)
Age: 27
End: 2022-08-11

## 2022-08-11 ENCOUNTER — APPOINTMENT (OUTPATIENT)
Dept: ORTHOPEDIC SURGERY | Facility: HOSPITAL | Age: 27
End: 2022-08-11

## 2022-08-11 VITALS
HEART RATE: 75 BPM | SYSTOLIC BLOOD PRESSURE: 115 MMHG | DIASTOLIC BLOOD PRESSURE: 59 MMHG | OXYGEN SATURATION: 97 % | RESPIRATION RATE: 16 BRPM

## 2022-08-11 VITALS
SYSTOLIC BLOOD PRESSURE: 118 MMHG | HEART RATE: 75 BPM | RESPIRATION RATE: 15 BRPM | HEIGHT: 64 IN | WEIGHT: 143.96 LBS | DIASTOLIC BLOOD PRESSURE: 64 MMHG | TEMPERATURE: 98 F | OXYGEN SATURATION: 97 %

## 2022-08-11 DIAGNOSIS — M89.8X6 OTHER SPECIFIED DISORDERS OF BONE, LOWER LEG: Chronic | ICD-10-CM

## 2022-08-11 DIAGNOSIS — N89.9 NONINFLAMMATORY DISORDER OF VAGINA, UNSPECIFIED: ICD-10-CM

## 2022-08-11 DIAGNOSIS — Z98.890 OTHER SPECIFIED POSTPROCEDURAL STATES: Chronic | ICD-10-CM

## 2022-08-11 PROBLEM — J45.909 UNSPECIFIED ASTHMA, UNCOMPLICATED: Chronic | Status: ACTIVE | Noted: 2018-07-30

## 2022-08-11 PROBLEM — Z87.898 PERSONAL HISTORY OF OTHER SPECIFIED CONDITIONS: Chronic | Status: ACTIVE | Noted: 2022-08-04

## 2022-08-11 PROBLEM — M08.00 UNSPECIFIED JUVENILE RHEUMATOID ARTHRITIS OF UNSPECIFIED SITE: Chronic | Status: ACTIVE | Noted: 2022-08-04

## 2022-08-11 PROBLEM — F41.9 ANXIETY DISORDER, UNSPECIFIED: Chronic | Status: ACTIVE | Noted: 2022-08-04

## 2022-08-11 PROBLEM — H20.9 UNSPECIFIED IRIDOCYCLITIS: Chronic | Status: ACTIVE | Noted: 2022-08-04

## 2022-08-11 PROBLEM — O02.1 MISSED ABORTION: Chronic | Status: ACTIVE | Noted: 2022-08-04

## 2022-08-11 LAB
GRAM STN FLD: SIGNIFICANT CHANGE UP
GRAM STN FLD: SIGNIFICANT CHANGE UP
HCG UR QL: NEGATIVE — SIGNIFICANT CHANGE UP
SPECIMEN SOURCE: SIGNIFICANT CHANGE UP
SPECIMEN SOURCE: SIGNIFICANT CHANGE UP

## 2022-08-11 PROCEDURE — 88364 INSITU HYBRIDIZATION (FISH): CPT | Mod: 26

## 2022-08-11 PROCEDURE — 88311 DECALCIFY TISSUE: CPT | Mod: 26

## 2022-08-11 PROCEDURE — 88365 INSITU HYBRIDIZATION (FISH): CPT | Mod: 26,59

## 2022-08-11 PROCEDURE — 88341 IMHCHEM/IMCYTCHM EA ADD ANTB: CPT | Mod: 26,59

## 2022-08-11 PROCEDURE — 27638 REMOVE/GRAFT LEG BONE LESION: CPT | Mod: LT

## 2022-08-11 PROCEDURE — 88342 IMHCHEM/IMCYTCHM 1ST ANTB: CPT | Mod: 26,59

## 2022-08-11 PROCEDURE — 88331 PATH CONSLTJ SURG 1 BLK 1SPC: CPT | Mod: 26

## 2022-08-11 PROCEDURE — 20245 BONE BIOPSY OPEN DEEP: CPT | Mod: LT

## 2022-08-11 PROCEDURE — 88307 TISSUE EXAM BY PATHOLOGIST: CPT | Mod: 26

## 2022-08-11 PROCEDURE — 88367 INSITU HYBRIDIZATION AUTO: CPT | Mod: 26

## 2022-08-11 PROCEDURE — 88360 TUMOR IMMUNOHISTOCHEM/MANUAL: CPT | Mod: 26

## 2022-08-11 RX ORDER — FENTANYL CITRATE 50 UG/ML
25 INJECTION INTRAVENOUS
Refills: 0 | Status: DISCONTINUED | OUTPATIENT
Start: 2022-08-11 | End: 2022-08-11

## 2022-08-11 RX ORDER — ASPIRIN/CALCIUM CARB/MAGNESIUM 324 MG
1 TABLET ORAL
Qty: 30 | Refills: 0
Start: 2022-08-11 | End: 2022-09-09

## 2022-08-11 RX ORDER — ONDANSETRON 8 MG/1
4 TABLET, FILM COATED ORAL ONCE
Refills: 0 | Status: DISCONTINUED | OUTPATIENT
Start: 2022-08-11 | End: 2022-08-25

## 2022-08-11 RX ORDER — FENTANYL CITRATE 50 UG/ML
50 INJECTION INTRAVENOUS
Refills: 0 | Status: DISCONTINUED | OUTPATIENT
Start: 2022-08-11 | End: 2022-08-11

## 2022-08-11 RX ADMIN — FENTANYL CITRATE 25 MICROGRAM(S): 50 INJECTION INTRAVENOUS at 12:09

## 2022-08-11 RX ADMIN — FENTANYL CITRATE 25 MICROGRAM(S): 50 INJECTION INTRAVENOUS at 11:55

## 2022-08-11 NOTE — ASU PREOP CHECKLIST - ASSESSMENT, HISTORY & PHYSICAL COMPLETED AND ON MEDICAL RECORD
Your current Plastic Surgery problem we are working together to treat is: Carpal Tunnel Syndrome.    Two weeks after surgery please start scar massage (vigorously rub the scar for 15 minutes 3 times per day; recommended lotions include VaniCream Lite Lotion, Cera-Ve, or Eucerin)    FOLLOW-UP  It is recommended you schedule a follow-up appointment with Dr. Walsh: As needed.      Office hours are 8:00 am to 5:00 pm Monday through Friday. If it is urgent that you speak with someone outside of these hours, the [facility] will be able to assist you. You can reach the office by calling [phone].    Thank you for choosing Devaughn Walsh M.D. as your Plastic Surgery provider!    At [facility], one important tool we use to improve our patient services is our Patient Survey. Following your visit you may receive our survey in the mail.     · Please take the time to complete the survey.     · If your visit with us was great, we want to hear about it.     · If we can improve, please let us know how.        done

## 2022-08-11 NOTE — ASU DISCHARGE PLAN (ADULT/PEDIATRIC) - CALL YOUR DOCTOR IF YOU HAVE ANY OF THE FOLLOWING:
Bleeding that does not stop/Swelling that gets worse/Fever greater than (need to indicate Fahrenheit or Celsius)/Wound/Surgical Site with redness, or foul smelling discharge or pus Bleeding that does not stop/Swelling that gets worse/Pain not relieved by Medications/Fever greater than (need to indicate Fahrenheit or Celsius)/Wound/Surgical Site with redness, or foul smelling discharge or pus/Numbness, tingling, color or temperature change to extremity/Nausea and vomiting that does not stop/Unable to urinate/Inability to tolerate liquids or foods

## 2022-08-11 NOTE — HISTORY OF PRESENT ILLNESS
[FreeTextEntry1] : This is a 27-year-old female with a history of JRA (diagnosed at age 3, no systemic treatment), nephrolithiasis (2018) who returns for FU after open biopsy of a left proximal tibia lesion (2/25/21). Biopsy results favored chronic inflammation without any malignant features w/ or cultures NGF. She did have initial relief of symptoms, however recently her pain has returned as of 3/2022.  Denies any fevers.  Has had outside labs including ESR CRP and WBC which have been normal.  Ibuprofen has helped.

## 2022-08-11 NOTE — ASU DISCHARGE PLAN (ADULT/PEDIATRIC) - CARE PROVIDER_API CALL
Arpan Sheehan)  [address], [facility] [address]  [place] [address]  Phone: [phone]  Fax: ()-  Follow Up Time:

## 2022-08-11 NOTE — ASU DISCHARGE PLAN (ADULT/PEDIATRIC) - NS MD DC FALL RISK RISK
For information on Fall & Injury Prevention, visit: [email or web address] OR  [email or web address] OR  https://www.cdc.gov/steadi/patient.html

## 2022-08-11 NOTE — PHYSICAL EXAM
[FreeTextEntry1] : NAD AOX3\par \par LLE:\par Incision CDI, healed, nontender. \par Knee ROM: 0-130 degrees. \par EHL/FHL/GS/TA 5/5. S/S/SP/DP/T SILT. Toes warm, BCR. Compartments soft.\par \par

## 2022-08-11 NOTE — ASU DISCHARGE PLAN (ADULT/PEDIATRIC) - NURSING INSTRUCTIONS
You were given 1000mg IV Tylenol for pain management.  Please DO NOT take Tylenol, Vicodin or Percocet for the next 6 hours (until 2 pm).  DO NOT EXCEED 3000MG OF TYLENOL OVER 24 HOURS.

## 2022-08-11 NOTE — ASU DISCHARGE PLAN (ADULT/PEDIATRIC) - ASU DC SPECIAL INSTRUCTIONSFT
Follow up with Dr Sheehan in 1 week. Call office for appointment. 50% partial weight bearing left lower extremity with crutches. Keep dressing clean, dry, and intact until followup. Rest, ice, and elevate affected extremity.

## 2022-08-11 NOTE — DATA REVIEWED
[de-identified] : 8/1/2022–left tib-fib x-rays (AP, lateral): There is an enlarged area of ossification over the posterior proximal tibial metadiaphysis measuring 30 x 18 mm with a broad zone of transition.  There is no periosteal reaction.  There is no fracture.  No endosteal scalloping.\par \par 7/27/22 :\par WBC: 5.6\par CRP: <0.4\par ESR: 17 (ref: <20)\par \par \par 7/15/22 - L tib MR: ill defined area of central decreased signal on all sequences with peripheral enhancement on post contrast and surrounding enhancement within proximal tibia. No endosteal scalloping or cortical breakthrough. No periosteal rxn. Lesion increased in size from prior, measuring 2.8X1.4cm.

## 2022-08-12 RX ORDER — TRAMADOL HYDROCHLORIDE 50 MG/1
50 TABLET, COATED ORAL
Qty: 21 | Refills: 0 | Status: ACTIVE | COMMUNITY
Start: 2022-08-12 | End: 1900-01-01

## 2022-08-15 ENCOUNTER — APPOINTMENT (OUTPATIENT)
Dept: ORTHOPEDIC SURGERY | Facility: CLINIC | Age: 27
End: 2022-08-15

## 2022-08-15 PROCEDURE — 99024 POSTOP FOLLOW-UP VISIT: CPT

## 2022-08-16 LAB
CULTURE RESULTS: NO GROWTH — SIGNIFICANT CHANGE UP
CULTURE RESULTS: SIGNIFICANT CHANGE UP
SPECIMEN SOURCE: SIGNIFICANT CHANGE UP
SPECIMEN SOURCE: SIGNIFICANT CHANGE UP

## 2022-08-29 ENCOUNTER — APPOINTMENT (OUTPATIENT)
Dept: ORTHOPEDIC SURGERY | Facility: CLINIC | Age: 27
End: 2022-08-29

## 2022-08-29 DIAGNOSIS — M89.9 DISORDER OF BONE, UNSPECIFIED: ICD-10-CM

## 2022-08-29 PROCEDURE — 73590 X-RAY EXAM OF LOWER LEG: CPT | Mod: LT

## 2022-08-29 PROCEDURE — 99024 POSTOP FOLLOW-UP VISIT: CPT

## 2022-08-31 LAB — SURGICAL PATHOLOGY STUDY: SIGNIFICANT CHANGE UP

## 2022-09-12 ENCOUNTER — NON-APPOINTMENT (OUTPATIENT)
Age: 27
End: 2022-09-12

## 2022-09-19 ENCOUNTER — APPOINTMENT (OUTPATIENT)
Dept: ORTHOPEDIC SURGERY | Facility: CLINIC | Age: 27
End: 2022-09-19

## 2022-09-27 NOTE — ED PROVIDER NOTE - DISCUSSED CLINICAL AND RADIOLOGICAL FINDINGS WITH, MDM
Writer spoke with patient for the [facility] GI clinic, Dr. Argueta,regarding upcoming procedure on 9/30.  We do require a COVID test 48-72 hours prior to that procedure. I am calling to make that appointment for you.   Covid screening appointment date of 9/27.  You are to go to the [place] laboratory for you screening. You check in to a lab .    Patient understood and had no questions. Test ordered.    
family/patient

## 2023-01-06 ENCOUNTER — TRANSCRIPTION ENCOUNTER (OUTPATIENT)
Age: 28
End: 2023-01-06

## 2023-01-06 ENCOUNTER — OUTPATIENT (OUTPATIENT)
Dept: OUTPATIENT SERVICES | Facility: HOSPITAL | Age: 28
LOS: 1 days | Discharge: HOME | End: 2023-01-06
Payer: COMMERCIAL

## 2023-01-06 VITALS
WEIGHT: 160.06 LBS | HEIGHT: 64 IN | RESPIRATION RATE: 17 BRPM | OXYGEN SATURATION: 97 % | HEART RATE: 79 BPM | TEMPERATURE: 99 F | DIASTOLIC BLOOD PRESSURE: 58 MMHG | SYSTOLIC BLOOD PRESSURE: 119 MMHG

## 2023-01-06 VITALS — RESPIRATION RATE: 18 BRPM | HEART RATE: 67 BPM | SYSTOLIC BLOOD PRESSURE: 109 MMHG | DIASTOLIC BLOOD PRESSURE: 56 MMHG

## 2023-01-06 DIAGNOSIS — Z98.890 OTHER SPECIFIED POSTPROCEDURAL STATES: Chronic | ICD-10-CM

## 2023-01-06 DIAGNOSIS — M89.8X6 OTHER SPECIFIED DISORDERS OF BONE, LOWER LEG: Chronic | ICD-10-CM

## 2023-01-06 DIAGNOSIS — Z64.0 PROBLEMS RELATED TO UNWANTED PREGNANCY: ICD-10-CM

## 2023-01-06 PROCEDURE — 88304 TISSUE EXAM BY PATHOLOGIST: CPT | Mod: 26

## 2023-01-06 RX ORDER — SERTRALINE 25 MG/1
1 TABLET, FILM COATED ORAL
Qty: 0 | Refills: 0 | DISCHARGE

## 2023-01-06 RX ORDER — TRAZODONE HCL 50 MG
0 TABLET ORAL
Qty: 0 | Refills: 0 | DISCHARGE

## 2023-01-06 RX ORDER — SODIUM CHLORIDE 9 MG/ML
1000 INJECTION, SOLUTION INTRAVENOUS
Refills: 0 | Status: DISCONTINUED | OUTPATIENT
Start: 2023-01-06 | End: 2023-01-20

## 2023-01-06 RX ORDER — ONDANSETRON 8 MG/1
4 TABLET, FILM COATED ORAL ONCE
Refills: 0 | Status: COMPLETED | OUTPATIENT
Start: 2023-01-06 | End: 2023-01-06

## 2023-01-06 RX ORDER — ONDANSETRON 8 MG/1
4 TABLET, FILM COATED ORAL ONCE
Refills: 0 | Status: DISCONTINUED | OUTPATIENT
Start: 2023-01-06 | End: 2023-01-06

## 2023-01-06 RX ORDER — ALBUTEROL 90 UG/1
1 AEROSOL, METERED ORAL
Qty: 0 | Refills: 0 | DISCHARGE

## 2023-01-06 RX ADMIN — ONDANSETRON 4 MILLIGRAM(S): 8 TABLET, FILM COATED ORAL at 13:41

## 2023-01-06 NOTE — H&P PST ADULT - HISTORY OF PRESENT ILLNESS
28yo with LMP 11/16/22 presents for eTOP. Doing well, no complaints. Denies cramping or vaginal bleeding.

## 2023-01-06 NOTE — ASU DISCHARGE PLAN (ADULT/PEDIATRIC) - D. IF YOU HAD ANY TYPE OF SEDATION, YOU MAY EXPERIENCE LIGHTHEADEDNESS, DIZZINESS, OR SLEEPINESS FOLLOWING YOUR PROCEDURE. A RESPONSIBLE ADULT SHOULD STAY WITH YOU FOR AT LEAST 24 HOURS FOLLOWING YOUR PROCEDURE.
Called layo from 's office. With coumadin and lovenox infor  Called pt with info   Statement Selected

## 2023-01-06 NOTE — ASU DISCHARGE PLAN (ADULT/PEDIATRIC) - ASU DC SPECIAL INSTRUCTIONSFT
PAIN MANAGEMENT:   Alternate Acetaminophen/Tylenol and Ibuprofen/Motrin (if you are eligible). Each of these medications can be taken every six hours. Try to stagger them so that you are taking something for pain every three hours (ex. Take Motrin at 12:00, Tylenol at 3:00, Motrin at 6:00, etc.) to maximize pain relief.  o Dosages       - Tylenol – 500-650 mg every 6 hours as needed       - Motrin/Ibuprofen - 600 mg every 6 hours as needed  o The maximum dose of Tylenol is 3000 mg in 24 hours, the maximum dose of Motrin/ibuprofen is 2400mg in 24 hours  A warm shower or heating pad may also help.    WHAT TO EXPECT AT HOME  -  Do not put anything in the vagina for at least 2 weeks after surgery unless otherwise instructed by your doctor (including tampons, douching, sexual intercourse, etc).  - It is normal to have some vaginal bleeding after surgery that would require the use of a pantiliner.     WHEN TO CALL YOUR DOCTOR:  - Fever (>100.4°F or 38.0°C) or chills  - Severe nausea or persistent vomiting.  - Bright red vaginal bleeding (soaking >1 pad/hour) or foul smelling vaginal drainage.  - Severe pain not relieved with pain medication.  - Pain with urination, cloudy urine, or foul smelling urine.  - Or if you have any other problems or questions.    please follow up with Dr. Griffiths in 2 weeks. An antibiotic prescription has been sent to your pharmacy

## 2023-01-06 NOTE — ASU PREOP CHECKLIST - NS PREOP CHK HIBICLENS NA
observation  Atarax 50mg PO Q6H PRN for anxiety  Atarax 100mg PO QHS PRN for insomnia  psych consult PRN
N/A

## 2023-01-06 NOTE — ASU DISCHARGE PLAN (ADULT/PEDIATRIC) - CARE PROVIDER_API CALL
Susi Griffiths)  Obstetrics and Gynecology  [address] [place] [id number]  Phone: [phone]  Fax: [phone]  Follow Up Time:

## 2023-01-06 NOTE — BRIEF OPERATIVE NOTE - OPERATION/FINDINGS
7 week sized, anteverted uterus, moderate amounts of products of conception cleared out using 7mm flex tubing

## 2023-01-06 NOTE — ASU DISCHARGE PLAN (ADULT/PEDIATRIC) - NS MD DC FALL RISK RISK
For information on Fall & Injury Prevention, visit: [email or web address] OR  [email or web address] OR  https://www.cdc.gov/steadi/patient.html

## 2023-01-06 NOTE — BRIEF OPERATIVE NOTE - NSICDXBRIEFPROCEDURE_GEN_ALL_CORE_FT
PROCEDURES:  Dilation and curettage, uterus, using suction, for elective  2023 13:37:05  Esther Sam

## 2023-01-10 DIAGNOSIS — Z33.2 ENCOUNTER FOR ELECTIVE TERMINATION OF PREGNANCY: ICD-10-CM

## 2023-01-10 DIAGNOSIS — J45.909 UNSPECIFIED ASTHMA, UNCOMPLICATED: ICD-10-CM

## 2023-01-10 DIAGNOSIS — F32.9 MAJOR DEPRESSIVE DISORDER, SINGLE EPISODE, UNSPECIFIED: ICD-10-CM

## 2023-01-10 DIAGNOSIS — M08.00 UNSPECIFIED JUVENILE RHEUMATOID ARTHRITIS OF UNSPECIFIED SITE: ICD-10-CM

## 2023-01-10 DIAGNOSIS — F41.9 ANXIETY DISORDER, UNSPECIFIED: ICD-10-CM

## 2023-01-10 DIAGNOSIS — Z83.3 FAMILY HISTORY OF DIABETES MELLITUS: ICD-10-CM

## 2023-01-10 DIAGNOSIS — Z83.49 FAMILY HISTORY OF OTHER ENDOCRINE, NUTRITIONAL AND METABOLIC DISEASES: ICD-10-CM

## 2023-01-13 LAB — SURGICAL PATHOLOGY STUDY: SIGNIFICANT CHANGE UP

## 2023-08-08 NOTE — ED ADULT NURSE NOTE - NS ED NURSE RECORD ANOTHER VITAL SIGN
Hi. Just an FYI that patient was seen in our vein clinic today and blood pressure was high at 153/78. Asymptomatic. Please contact patient if further action is needed.  No

## 2025-03-29 NOTE — PACU DISCHARGE NOTE - THE ANESTHESIA ORDERS USED IN THE PACU ORDER SET WILL BE DISCONTINUED UPON TRANSFER OF THIS PATIENT
SHIFT SUMMARY
PATIENT ALERT AND ORIENTED X4. HAD NO COMPLAINTS OF PAIN OR SHORTNESS OF
BREATH. ON ROOM AIR WITH SPO2 >90%. VITAL SIGNS STABLE. NEURO IS BACK TO
BASELINE. NO ACUTE ISSUES NOTED OVERNIGHT. WILL CONTINUE TO MONITOR. CALL
LIGHT WITHIN REACH. Statement Selected

## 2025-04-04 NOTE — DISCUSSION/SUMMARY
Patient needs a new referral for Dr. Hines, seeing him in May, Ophthalmology.     Seeing him in May.     2.  Patient is taking Tirzepatide, that she is getting thru a compound pharmacy. Went to [facility], was recommended for her to start this. Said she did a telehealth thru the pharmacy, someone that she uses, a NP.     Patient is asking if Dr. Cramer would order labs for her, to set where, \"everything\" is at with her on this medication.     Patient is aware that Dr. Cramer might not be able to order these, due to her not managing this.     Endo/GI are fine with her taking this.     Asking for a call back.    [de-identified] : 27-year-old female with a local recurrence of a lesion thought to represent chronic inflammation within the left tibial metaphysis in a patient with JRA.  Given the enlargement of this lesion it is likely an active process and may represent both benign and malignant etiologies.  Osteoblastoma and OM are on the differential, including others.  I have therefore recommended a repeat open biopsy to obtain more tissue vs. formal curettage of this tumor and packing with bone graft.\par \par We discussed risks, benefits and alternatives. Rationale of care was reviewed and all questions were answered. Surgical risks include but are not limited to infection, bleeding, nerve damage, chronic pain, fracture, VTE, limited range of motion, weakness, loss of life/limb, and need for further surgical procedures. Patient understood all of these risks and would like to proceed. \par \par Tentative date will be for 8/11/2022.

## (undated) DEVICE — DRAPE TOWEL BLUE 17" X 24"

## (undated) DEVICE — DRSG DERMABOND 0.7ML

## (undated) DEVICE — ELCTR GROUNDING PAD ADULT COVIDIEN

## (undated) DEVICE — DRSG STOCKINETTE IMPERVIOUS XL

## (undated) DEVICE — SUT VICRYL 0 27" CT-2 UNDYED

## (undated) DEVICE — DRAPE 3/4 SHEET 52X76"

## (undated) DEVICE — SYR LUER LOK 20CC

## (undated) DEVICE — LIJ-SYNTHES LOCKING SMALL FRAGMENT (REQUIRES BILL) (IMPLANT): Type: DURABLE MEDICAL EQUIPMENT

## (undated) DEVICE — DRSG TELFA 3 X 8

## (undated) DEVICE — DRAPE INSTRUMENT POUCH 6.75" X 11"

## (undated) DEVICE — DRSG ACE BANDAGE 6"

## (undated) DEVICE — PACK LIJ BASIC ORTHO

## (undated) DEVICE — DRAPE C ARM UNIVERSAL

## (undated) DEVICE — GLV 8.5 PROTEXIS (WHITE)

## (undated) DEVICE — SUT MONOCRYL 3-0 27" PS-2 UNDYED

## (undated) DEVICE — GLV 8 PROTEXIS (WHITE)

## (undated) DEVICE — NDL SPINAL 22G X 3.5" (BLACK)

## (undated) DEVICE — ANESTHESIA CIRCUIT ADULT HMEF

## (undated) DEVICE — HANDPIECE IRRIGATION W/ BATTERY PACKAND HIGH FLOW TIP

## (undated) DEVICE — CANISTER DISPOSABLE THIN WALL 3000CC

## (undated) DEVICE — DRSG COBAN 4"

## (undated) DEVICE — TOURNIQUET ESMARK 6"

## (undated) DEVICE — DRAPE U POLY BLUE 60"X60"

## (undated) DEVICE — DRSG WEBRIL 4"

## (undated) DEVICE — TUBING LEVEL ONE NORMOFLO SET

## (undated) DEVICE — NDL HYPO REGULAR BEVEL 25G X 1.5" (BLUE)

## (undated) DEVICE — WARMING BLANKET UPPER ADULT

## (undated) DEVICE — DRSG TEGADERM 4X4.75"

## (undated) DEVICE — LABELS BLANK W PEN

## (undated) DEVICE — DRSG STERISTRIPS 0.5 X 4"

## (undated) DEVICE — DRAPE IOBAN 33" X 23"

## (undated) DEVICE — SAW BLADE MICROAIRE SAGITTAL 9.4MMX25.4MMX0.6MM

## (undated) DEVICE — DRAPE SPLIT SHEET 77" X 120"

## (undated) DEVICE — Device

## (undated) DEVICE — PACK TOTAL JOINT

## (undated) DEVICE — VENODYNE/SCD SLEEVE CALF MEDIUM

## (undated) DEVICE — MIDAS REX LEGEND BALL FLUTED LG BORE 5.0MM X 14CM